# Patient Record
Sex: MALE | Race: WHITE | NOT HISPANIC OR LATINO | Employment: OTHER | URBAN - METROPOLITAN AREA
[De-identification: names, ages, dates, MRNs, and addresses within clinical notes are randomized per-mention and may not be internally consistent; named-entity substitution may affect disease eponyms.]

---

## 2020-06-30 ENCOUNTER — HOSPITAL PATHOLOGY (OUTPATIENT)
Dept: OTHER | Facility: CLINIC | Age: 60
End: 2020-06-30

## 2020-07-03 LAB — COPATH REPORT: NORMAL

## 2021-10-08 ENCOUNTER — TRANSFERRED RECORDS (OUTPATIENT)
Dept: HEALTH INFORMATION MANAGEMENT | Facility: CLINIC | Age: 61
End: 2021-10-08

## 2021-10-11 ENCOUNTER — TRANSFERRED RECORDS (OUTPATIENT)
Dept: HEALTH INFORMATION MANAGEMENT | Facility: CLINIC | Age: 61
End: 2021-10-11

## 2022-11-08 ENCOUNTER — TRANSFERRED RECORDS (OUTPATIENT)
Dept: HEALTH INFORMATION MANAGEMENT | Facility: CLINIC | Age: 62
End: 2022-11-08

## 2023-04-11 ENCOUNTER — TRANSFERRED RECORDS (OUTPATIENT)
Dept: HEALTH INFORMATION MANAGEMENT | Facility: CLINIC | Age: 63
End: 2023-04-11

## 2023-04-13 ENCOUNTER — TRANSFERRED RECORDS (OUTPATIENT)
Dept: HEALTH INFORMATION MANAGEMENT | Facility: CLINIC | Age: 63
End: 2023-04-13

## 2023-05-08 ENCOUNTER — HOSPITAL ENCOUNTER (EMERGENCY)
Facility: CLINIC | Age: 63
Discharge: LEFT BEFORE DISCHARGE | End: 2023-05-08
Payer: COMMERCIAL

## 2023-05-24 ENCOUNTER — TRANSFERRED RECORDS (OUTPATIENT)
Dept: HEALTH INFORMATION MANAGEMENT | Facility: CLINIC | Age: 63
End: 2023-05-24

## 2023-05-25 ENCOUNTER — TRANSFERRED RECORDS (OUTPATIENT)
Dept: HEALTH INFORMATION MANAGEMENT | Facility: CLINIC | Age: 63
End: 2023-05-25

## 2023-05-25 LAB
ALT SERPL-CCNC: 20 IU/L (ref 0–44)
AST SERPL-CCNC: 15 IU/L (ref 0–40)

## 2023-06-06 ENCOUNTER — TRANSFERRED RECORDS (OUTPATIENT)
Dept: HEALTH INFORMATION MANAGEMENT | Facility: CLINIC | Age: 63
End: 2023-06-06

## 2023-08-31 ENCOUNTER — REFERRAL (OUTPATIENT)
Dept: TRANSPLANT | Facility: CLINIC | Age: 63
End: 2023-08-31

## 2023-08-31 DIAGNOSIS — N18.4 CHRONIC KIDNEY DISEASE, STAGE 4, SEVERELY DECREASED GFR (H): Primary | ICD-10-CM

## 2023-08-31 DIAGNOSIS — D47.2 MGUS (MONOCLONAL GAMMOPATHY OF UNKNOWN SIGNIFICANCE): ICD-10-CM

## 2023-08-31 DIAGNOSIS — N18.4 CHRONIC KIDNEY DISEASE, STAGE IV (SEVERE) (H): ICD-10-CM

## 2023-08-31 DIAGNOSIS — G47.33 OBSTRUCTIVE SLEEP APNEA: ICD-10-CM

## 2023-08-31 DIAGNOSIS — Z01.818 PRE-TRANSPLANT EVALUATION FOR KIDNEY TRANSPLANT: ICD-10-CM

## 2023-08-31 DIAGNOSIS — Z76.82 ORGAN TRANSPLANT CANDIDATE: ICD-10-CM

## 2023-08-31 NOTE — LETTER
Sept. 12, 2023    Titus Fowler  [address]  [place] [address]    Dear Titus,    Thank you for your interest in the Transplant Center at [facility]. We look forward to being a part of your care team and assisting you through the transplant process.    As we discussed, your transplant coordinator is Roxie Herman (Kidney).  You may call your coordinator at any time with questions or concerns.  Your first scheduled call will be on Sept. 19, 2023 about 1:00 pm.  If this needs to change, call [phone].    Please complete the following.    Fill out and return the enclosed forms  Authorization for Electronic Communication  Authorization to Discuss Protected Health Information  Authorization for Release of Protected Health Information    Sign up for:  [employer], access to your electronic medical record (see enclosed pamphlet)  [email or web address], a transplant education website        You can use these tools to learn more about your transplant, communicate with your care team, and track your medical details                  Sincerely,      Solid Organ Transplant  [facility]    cc: Care Team

## 2023-09-08 ENCOUNTER — TELEPHONE (OUTPATIENT)
Dept: TRANSPLANT | Facility: CLINIC | Age: 63
End: 2023-09-08
Payer: COMMERCIAL

## 2023-09-08 NOTE — TELEPHONE ENCOUNTER
Patient Call: General  Route to LPN    Reason for call: Titus up's nurse call.    Call back needed? Yes    Return Call Needed  Same as documented in contacts section  When to return call?: Same day: Route High Priority

## 2023-09-12 ENCOUNTER — DOCUMENTATION ONLY (OUTPATIENT)
Dept: TRANSPLANT | Facility: CLINIC | Age: 63
End: 2023-09-12
Payer: COMMERCIAL

## 2023-09-12 VITALS — BODY MASS INDEX: 27.4 KG/M2 | HEIGHT: 69 IN | WEIGHT: 185 LBS

## 2023-09-12 NOTE — TELEPHONE ENCOUNTER
PCP: Agustín Taveras [facility]  Referring Organization: [facility]  Referring Provider: Genevieve RAMSEY CNP   Referring Diagnosis: CKD stage 4   Nephrologist: Dr. Hanna Epps    GFR/Date: 19 (8/24/23)    Is patient under the age of 65? yes  Is patient diabetic? no  Is patient on insulin? no  Was patient offered a pancreas transplant referral? no    Is patient in a group home/assisted living? no  Does patient have a guardian? no    Referral intake process completed.  Patient is aware that after financial approval is received, medical records will be requested.   Patient confirmed for a callback from transplant coordinator on Sept. 19, 2023. (within 2 weeks)  Tentative evaluation date Oct. 30, 2023 slot 1 (within 4 weeks) if appointment is virtual, does patient have capabilities of setting this up?     Confirmed coordinator will discuss evaluation process in more detail at the time of their call.   Patient is aware of the need to arrange age appropriate cancer screening, vaccinations, and dental care.  Reminded patient to complete questionnaire, complete medical records release, and review packet prior to evaluation visit .  Assessed patient for special needs (ie-wheelchair, assistance, guardian, and ):  none   Patient instructed to call [phone] with questions.     Patient gave verbal consent during intake call to obtain medical records and documents outside of [facility]:  yes

## 2023-09-20 NOTE — TELEPHONE ENCOUNTER
Reviewed pt's chart for pre-kiney transplant evaluation planning. Coordinator first call on 9/19/23. PKE STD on 10/30/23 eval A     services required no. Is pt able to attend virtual education class? yes    Pt has CKD stage 4, biopsy: none. Qualifying GFR of 18 on 7/28/23.  Pt is not on dialysis. Pt is not a diabetic, not on insulin.     Health hx: hypertension, PAUL with cpap, history of benign prostate enlargement, ulcerative colitis .Also has a history of MGUS and has followed with oncology and reports his markers are gone  Heart hx: no significant heart history.  Lung hx: NA. Surgical hx: had a TURP done in 2018 due to bladder retention.  Pt is not smoker, does rarely consume alcohol, and no recreational drugs. Does not have current infection, no non-healing wounds, or active cancer.    Health maintenance items: BMI 27.  Colonoscopy UTD.  Dental: UTD. Vaccinations UTD. Pt is independent w/ ADLs, plays golf regularly and reports high amounts of activity in half-way  Pt lives in [place] w/ spouse.  Excellent family support following transplant. Pt has a a son interested in live donor.     Contacted patient and introduced myself as their Transplant Coordinator, also introduced the role of the Transplant Coordinator in the transplant process.  Explained the purpose of this call including reviewing next steps and answering questions.      Confirmed Referring Provider, Genevieve Sanders NP; and Primary Care Physician, Dr. Taveras. Explained to the patient of the importance of continued communication with referring providers and primary care physicians.      Reviewed components of transplant evaluation process including necessary appointments, tests, and procedures.  Instructed pt to bring 1-2 people with them to eval and to eat and drink and normally on eval day    Answered questions for patient regarding evaluation, provided my name and contact information and requested they call/message with any  additional questions or concerns.  Informed patient they will receive a letter with information discussed in referral call. Determined that patient would like information regarding transplant by:       Email, MyChart, and/or Phone Call.  Encouraged the use of [employer].    Informed pt about transplant educational website, asked to watch pre-kidney or sign up for education class prior to evaluation. Link for educational videos were provided.  Additional informational web sites about transplant were discussed. Links provided to www.unos.org and www.srtr.org in letter sent to patient.  Pt expressed excellent understanding of all and were in good agreement with the plan.    Confirmed STD 10/30/23. Informed pt they will hear from scheduling to arrange appointment times for evaluation day. As well as, receive an email from Lucero in our Office prior to eval with a Receipt of Info and educational materials - instructed to read materials and sign consent prior to eval. Smartset orders entered.

## 2023-10-26 ENCOUNTER — TELEPHONE (OUTPATIENT)
Dept: TRANSPLANT | Facility: CLINIC | Age: 63
End: 2023-10-26
Payer: COMMERCIAL

## 2023-10-26 NOTE — TELEPHONE ENCOUNTER
Spoke with patient. Confirmed upcoming PKE appointments at [facility] on 10/30/23 starting at 0730. Instructed patient may eat breakfast and take regularly scheduled medications.

## 2023-10-27 NOTE — PROGRESS NOTES
TRANSPLANT NEPHROLOGY RECIPIENT EVALUATION NOTE    Assessment and Plan:  # Kidney Transplant Evaluation: Patient is a good candidate overall. Benefits of a living donor transplant were discussed.    # CKD: from unclear etiology, but likely from longstanding hypertension and bladder outlet obstruction: Progressive disease since 2018 with recent eGFRs of 19-20. When ready, he would likely benefit from a kidney transplant.     # BPH: S/p laser vaporization with significant symptom improvement. PVR 5 ml.     # Cardiac Risk: no known history of kidney disease. Given long standing risk factors, will need risk assessment.     # PAD Screening: per surgery     # MGUS: normal kappa/lambda ratio 1.07 and no monoclonal peak - as last oncology visit in August 2023 .Benign bone marrow exam with normal flow cytometry, followed by outside oncology and recently see in August 2021. Appreciate oncology input regarding slight increase of monoclonal protein of 0.1 today.     5/2020 BMB: Bone marrow cellularity 50% with adequate trilineage hematopoiesis and 2% polytypic plasma cells. No evidence of a lymphomatous infiltrate     # Ulcerative Colitis: stable with Stelara. No active disease on 11/2022 colonoscopy. Followed by [facility].     # Health Maintenance: Colonoscopy: Up to date and Dental: Up to date    - Discussed the risks and benefits of a transplant, including the risk of surgery and immunosuppression medications.  Patient's overall evaluation will be discussed in the Transplant Program's regular meeting with a final recommendation on the patients suitability for transplant to be made at that time.    Pending completion of the full evaluation, patient presently appears to be enough of an acceptable kidney transplant recipient candidate to have any potential kidney donors start the evaluation process.  Patient was seen in conjunction with     Evaluation:  Titus Fowler was seen in consultation at the request of Dr. Elisa Rader  "for evaluation as a potential kidney transplant recipient.    Reason for Visit:  Titus Fowler is a 62 year old male with CKD from unknown etiology, who presents for kidney transplant evaluation.    History of Present Illness:  Titus Fowler is a 62-year-old male with history of CKD from unclear etiology.  likely from longstanding hypertension and bladder outlet obstruction. History of HTN since your 40s.  Found to have an elevated Cr  ~ 1.7 mg/dl in 2017 in the setting of BPH and urinary retention for which he underwent laser vaporization of the prostate in 2018. Creatinine baseline was then  1.9 to 2.1 mg/dl since September of 2018 -> 2.5-2.7 mg/dl in 2023 ->3.4-3.7 mg/dl since February 2023 with correlating eGFRs of 19-20.                Kidney Disease Hx:          Kidney Disease Dx:  longstanding hypertension and bladder outlet obstruction.       Biopsy Proven: No         On Dialysis: No       Primary Nephrologist: Dr. Epps        H/o Kidney Stones: No       H/o Recurrent/Frequent UTI: No         Diabetic Hx: None           Cardiac/Vascular Disease Risk Factors:        Cardiac Risk Factors: Hypertension, CKD, and Smoking       Known CAD: No       Known PAD/Caludication Symptoms: No       Known Heart Failure: No       Arrhythmia: No       Pulmonary Hypertension: No       Valvular Disease: No       Other: None         Viral Serology Status       CMV IgG Antibody: Unknown       EBV IgG Antibody: Unknown         Volume Status/Weight:        Volume status: Euvolemic       Weight:  Acceptable BMI       BMI: There is no height or weight on file to calculate BMI.         Functional Capacity/Frailty:        Retired . Stays active playing tennis, golf, fishing, kayaking, going to the cabin, snow mobiling,  ice fishing. No exertional symptoms.     Fatigue/Decreased Energy: [] No [x] Yes \"Up and down\"   Chest Pain or SOB with Exertion: [] No [x] Yes Dyspnea sometimes with exercise,  c/o random " heart flutters.    Significant Weight Change: [x] No [] Yes    Nausea, Vomiting or Diarrhea: [x] No [] Yes    Fever, Sweats or Chills:  [x] No [] Yes    Leg Swelling [x] No [] Yes        History of Cancer: None      Allergy Testing Questions:   Medication that caused a reaction None   Antibiotics used that didn't give an allergic reaction?  None    COVID Vaccination Up To Date: Yes    Potential Living Kidney Donors: Yes    Review of Systems:  A comprehensive review of systems was obtained and negative, except as noted in the HPI or PMH.    Past Medical History:   Medical record was reviewed and PMH was discussed with patient and noted below.  Past Medical History:   Diagnosis Date    Hypertension     Noninfectious ileitis     ulcerative colitis     Sleep apnea     c pap       Past Social History:   Past Surgical History:   Procedure Laterality Date    ESOPHAGOSCOPY, GASTROSCOPY, DUODENOSCOPY (EGD), COMBINED N/A 10/16/2015    Procedure: COMBINED ENDOSCOPIC ULTRASOUND, ESOPHAGOSCOPY, GASTROSCOPY, DUODENOSCOPY (EGD), FINE NEEDLE ASPIRATE/BIOPSY;  Surgeon: Bambi Salgado MD;  Location:  GI    GENITOURINARY SURGERY  [address] [place] [address] UGI ENDOSCOPY W EUS N/A 10/02/2014    Procedure: COMBINED ENDOSCOPIC ULTRASOUND, ESOPHAGOSCOPY, GASTROSCOPY, DUODENOSCOPY (EGD);  Surgeon: Bambi Salgado MD;  Location:  OR    HEAD & NECK SURGERY      dental surg    ORTHOPEDIC SURGERY      nerve repair right hand, meniscus repair-right     Personal history of bleeding or anesthesia problems: No    Family History:  No family history on file.    Personal History:   Social History     Socioeconomic History    Marital status:      Spouse name: Not on file    Number of children: Not on file    Years of education: Not on file    Highest education level: Not on file   Occupational History    Not on file   Tobacco Use    Smoking status: Former     Types: Cigars    Smokeless tobacco: Never    Tobacco comments:      has one cigar every 3 mos   Substance and Sexual Activity    Alcohol use: Yes     Comment: 3-6 drinks per week    Drug use: No     Comment: THC gummies - sleep    Sexual activity: Not on file   Other Topics Concern    Not on file   Social History Narrative    Not on file     Social Determinants of Health     Financial Resource Strain: Not on file   Food Insecurity: Not on file   Transportation Needs: Not on file   Physical Activity: Not on file   Stress: Not on file   Social Connections: Not on file   Interpersonal Safety: Not on file   Housing Stability: Not on file       Allergies:  Allergies   Allergen Reactions    Nuts [Peanut-Containing Drug Products] Anaphylaxis       Medications:  Current Outpatient Medications   Medication Sig    ATENOLOL PO Take 25 mg by mouth daily    cetirizine (ZYRTEC) 10 MG tablet Take 20 mg by mouth daily    EPINEPHrine (EPIPEN) 0.3 MG/0.3ML injection Inject 0.3 mLs (0.3 mg) into the muscle once as needed    fluticasone (FLONASE) 50 MCG/ACT nasal spray Spray 2 sprays into both nostrils daily    HYDROcodone-acetaminophen (NORCO) 5-325 MG per tablet Take 1-2 tablets by mouth every 4 hours as needed for moderate to severe pain    InFLIXimab (REMICADE IV) Iv infusion q8wks    tamsulosin (FLOMAX) 0.4 MG 24 hr capsule Take by mouth daily Takes 2 tabs daily     No current facility-administered medications for this visit.       Vitals:  There were no vitals taken for this visit.    Exam:  GENERAL APPEARANCE: alert and no distress  HENT: mouth without ulcers or lesions  RESP: lungs clear to auscultation - no rales, rhonchi or wheezes  CV: regular rhythm, normal rate, no rub, no murmur  EDEMA: no LE edema bilaterally  ABDOMEN: soft, nondistended, nontender, bowel sounds normal  MS: extremities normal - no gross deformities noted, no evidence of inflammation in joints, no muscle tenderness  SKIN: no rash    Results:   No results found for this or any previous visit (from the past 336  hour(s)).

## 2023-10-27 NOTE — PROGRESS NOTES
[facility] Solid Organ Transplant  Outpatient MNT: Kidney Transplant Evaluation    Current BMI: 28.5 (HT 69.5 in,  lbs/89 kg)  BMI guideline for kidney transplant up to a BMI of 40 / per surgeon discretion     Frailty Assessment -- Not Frail (0/5 points)-       Time Spent: 30 minutes  Visit Type: Initial   Referring Physician: Dior   Pt accompanied by: his wife & dtr     History of previous txp: none   Dialysis: no    Nutrition Assessment  Pt reports watching sodium a bit more lately.    - Appetite: good/baseline   - Food allergies/intolerances: instance 10 years ago with cashews & peanuts - but no issues since   - Meal prep & grocery shopping: pt and wife   - Previous RD education: no   - Issues chewing or swallowing: no  - N/V/D/C: no  - Food access concerns: not asked     Vitamins, Supplements, Pertinent Meds: vit D  Herbal Medicines/Supplements: none     Edema: none    Weight hx: wt stable overall     Diet Recall  Breakfast Fruit (cantaloupe, grapes), toast w/ PB, cold leftovers   Lunch Leftovers, sandwich (BLT), homemade soup, turkey s/w    Dinner Chicken casserole - with can of soup; pizza; grilled steak/chicken + veggie/salad    Snacks Crackers w/ cheese, homemade roasted pumpkin seeds, licorice, chocolate   Beverages Water w/ 4 oz cranberry cherry juice, coffee, watered down rapsberry lemonade concentrate, Gatorade 1x/week    Alcohol 5 drinks/week (mostly wine, some beer)   Dining out 2-3x/week: Mexican, [place] [employer] turkey s/w, Chinese      Physical Activity  Tennis 1x/week- 2 hours  Golfing   Skating in the winter   [email or web address]  10/26 K 4.4 Phos 2.9     Anthropometrics   IBW/%IBW: 163/119   Dosing wt: 195 lbs/89 kg    Estimated Nutrition Needs   Protein: 55-70 grams/day (0.6-0.8 g/kg) for CKD    Nutrition Diagnosis  No nutrition diagnosis identified at this time     Nutrition Intervention  Nutrition education provided:  Discussed sodium intake (low sodium foods and drinks,  seasoning food without salt and tips for low sodium diet).  Encouraged pt to look at labels, online, for perspective for sodium content of foods, etc. Consider keeping track of sodium to get a sense of how much he is consuming.   Reviewed protein amount, plant vs animal protein, etc.   No need to restrict K/Phos in diet based on wnl lab values     Reviewed post txp diet guidelines in brief (will review in further detail post txp):  (1) Review of proper food safety measures d/t immunosuppressant therapy post-op and increased risk for food-borne illness    (2) Avoid the following post txp d/t risk for rejection, unknown effects on the organs, and/or potential interactions with immunosuppressants:  - Herbal, Chinese, holistic, chiropractic, natural, alternative medicines and supplements  - Detoxes and cleanses  - Weight loss pills  - Protein powders or other products with extracts or herbs (ie green tea extract)    (3) Med regimen and possible side effects    Patient Understanding: Pt verbalized understanding of education provided.  Expected Engagement: Good  Follow-Up Plans: PRN     Nutrition Goals  No nutrition goals identified at this time     Vianca Roper, RD, LD, CCTD

## 2023-10-28 ENCOUNTER — HEALTH MAINTENANCE LETTER (OUTPATIENT)
Age: 63
End: 2023-10-28

## 2023-10-29 LAB
A1 AB TITR SERPL: 64 {TITER}
A1 AB TITR SERPL: 64 {TITER}
A2 AB TITR SERPL: 16 {TITER}
A2 AB TITR SERPL: 16 {TITER}
ABO/RH(D): NORMAL
ABO/RH(D): NORMAL
ANTIBODY SCREEN: NEGATIVE
ANTIBODY TITER IGM SCREEN: NEGATIVE
SPECIMEN EXPIRATION DATE: NORMAL

## 2023-10-30 ENCOUNTER — ANCILLARY PROCEDURE (OUTPATIENT)
Dept: GENERAL RADIOLOGY | Facility: CLINIC | Age: 63
End: 2023-10-30
Attending: NURSE PRACTITIONER
Payer: COMMERCIAL

## 2023-10-30 ENCOUNTER — ANCILLARY PROCEDURE (OUTPATIENT)
Dept: CARDIOLOGY | Facility: CLINIC | Age: 63
End: 2023-10-30
Attending: NURSE PRACTITIONER
Payer: COMMERCIAL

## 2023-10-30 ENCOUNTER — LAB (OUTPATIENT)
Dept: LAB | Facility: CLINIC | Age: 63
End: 2023-10-30
Attending: NURSE PRACTITIONER
Payer: COMMERCIAL

## 2023-10-30 ENCOUNTER — OFFICE VISIT (OUTPATIENT)
Dept: TRANSPLANT | Facility: CLINIC | Age: 63
End: 2023-10-30
Attending: NURSE PRACTITIONER
Payer: COMMERCIAL

## 2023-10-30 ENCOUNTER — DOCUMENTATION ONLY (OUTPATIENT)
Dept: TRANSPLANT | Facility: CLINIC | Age: 63
End: 2023-10-30

## 2023-10-30 VITALS
SYSTOLIC BLOOD PRESSURE: 141 MMHG | HEIGHT: 70 IN | HEART RATE: 78 BPM | WEIGHT: 195.9 LBS | DIASTOLIC BLOOD PRESSURE: 82 MMHG | OXYGEN SATURATION: 99 % | BODY MASS INDEX: 28.05 KG/M2

## 2023-10-30 DIAGNOSIS — N18.4 CHRONIC KIDNEY DISEASE, STAGE IV (SEVERE) (H): ICD-10-CM

## 2023-10-30 DIAGNOSIS — D47.2 MGUS (MONOCLONAL GAMMOPATHY OF UNKNOWN SIGNIFICANCE): ICD-10-CM

## 2023-10-30 DIAGNOSIS — G47.33 OBSTRUCTIVE SLEEP APNEA: ICD-10-CM

## 2023-10-30 DIAGNOSIS — Z01.818 PRE-TRANSPLANT EVALUATION FOR KIDNEY TRANSPLANT: ICD-10-CM

## 2023-10-30 DIAGNOSIS — Z76.82 ORGAN TRANSPLANT CANDIDATE: ICD-10-CM

## 2023-10-30 DIAGNOSIS — N18.4 CHRONIC KIDNEY DISEASE, STAGE 4, SEVERELY DECREASED GFR (H): ICD-10-CM

## 2023-10-30 LAB
ALBUMIN SERPL BCG-MCNC: 4.2 G/DL (ref 3.5–5.2)
ALBUMIN UR-MCNC: NEGATIVE MG/DL
ALP SERPL-CCNC: 72 U/L (ref 40–129)
ALT SERPL W P-5'-P-CCNC: 28 U/L (ref 0–70)
ANION GAP SERPL CALCULATED.3IONS-SCNC: 10 MMOL/L (ref 7–15)
APPEARANCE UR: CLEAR
APTT PPP: 28 SECONDS (ref 22–38)
AST SERPL W P-5'-P-CCNC: 27 U/L (ref 0–45)
BASOPHILS # BLD AUTO: 0.1 10E3/UL (ref 0–0.2)
BASOPHILS NFR BLD AUTO: 1 %
BILIRUB SERPL-MCNC: 0.3 MG/DL
BILIRUB UR QL STRIP: NEGATIVE
BUN SERPL-MCNC: 41.3 MG/DL (ref 8–23)
CALCIUM SERPL-MCNC: 9.6 MG/DL (ref 8.8–10.2)
CHLORIDE SERPL-SCNC: 103 MMOL/L (ref 98–107)
COLOR UR AUTO: ABNORMAL
CREAT SERPL-MCNC: 3.25 MG/DL (ref 0.67–1.17)
DEPRECATED HCO3 PLAS-SCNC: 27 MMOL/L (ref 22–29)
EGFRCR SERPLBLD CKD-EPI 2021: 21 ML/MIN/1.73M2
EOSINOPHIL # BLD AUTO: 0.2 10E3/UL (ref 0–0.7)
EOSINOPHIL NFR BLD AUTO: 2 %
ERYTHROCYTE [DISTWIDTH] IN BLOOD BY AUTOMATED COUNT: 11.6 % (ref 10–15)
FACTOR 2 INTERPRETATION: NORMAL
FACTOR V INTERPRETATION: NORMAL
GLUCOSE SERPL-MCNC: 124 MG/DL (ref 70–99)
GLUCOSE UR STRIP-MCNC: NEGATIVE MG/DL
HCT VFR BLD AUTO: 35.7 % (ref 40–53)
HGB BLD-MCNC: 12.1 G/DL (ref 13.3–17.7)
HGB UR QL STRIP: NEGATIVE
IMM GRANULOCYTES # BLD: 0.1 10E3/UL
IMM GRANULOCYTES NFR BLD: 1 %
INR PPP: 1.04 (ref 0.85–1.15)
KETONES UR STRIP-MCNC: NEGATIVE MG/DL
LAB DIRECTOR COMMENTS: NORMAL
LAB DIRECTOR DISCLAIMER: NORMAL
LAB DIRECTOR INTERPRETATION: NORMAL
LAB DIRECTOR METHODOLOGY: NORMAL
LAB DIRECTOR RESULTS: NORMAL
LEUKOCYTE ESTERASE UR QL STRIP: NEGATIVE
LVEF ECHO: NORMAL
LYMPHOCYTES # BLD AUTO: 2.2 10E3/UL (ref 0.8–5.3)
LYMPHOCYTES NFR BLD AUTO: 22 %
MCH RBC QN AUTO: 33.2 PG (ref 26.5–33)
MCHC RBC AUTO-ENTMCNC: 33.9 G/DL (ref 31.5–36.5)
MCV RBC AUTO: 98 FL (ref 78–100)
MONOCYTES # BLD AUTO: 0.7 10E3/UL (ref 0–1.3)
MONOCYTES NFR BLD AUTO: 7 %
MUCOUS THREADS #/AREA URNS LPF: PRESENT /LPF
NEUTROPHILS # BLD AUTO: 6.7 10E3/UL (ref 1.6–8.3)
NEUTROPHILS NFR BLD AUTO: 67 %
NITRATE UR QL: NEGATIVE
NRBC # BLD AUTO: 0 10E3/UL
NRBC BLD AUTO-RTO: 0 /100
PH UR STRIP: 6 [PH] (ref 5–7)
PLATELET # BLD AUTO: 267 10E3/UL (ref 150–450)
POTASSIUM SERPL-SCNC: 3.6 MMOL/L (ref 3.4–5.3)
PROT SERPL-MCNC: 7.6 G/DL (ref 6.4–8.3)
RBC # BLD AUTO: 3.65 10E6/UL (ref 4.4–5.9)
RBC URINE: <1 /HPF
SODIUM SERPL-SCNC: 140 MMOL/L (ref 135–145)
SP GR UR STRIP: 1.01 (ref 1–1.03)
SPECIMEN DESCRIPTION: NORMAL
T PALLIDUM AB SER QL: NONREACTIVE
TOTAL PROTEIN SERUM FOR ELP: 7.2 G/DL (ref 6.4–8.3)
UROBILINOGEN UR STRIP-MCNC: NORMAL MG/DL
WBC # BLD AUTO: 9.8 10E3/UL (ref 4–11)
WBC URINE: <1 /HPF

## 2023-10-30 PROCEDURE — 81382 HLA II TYPING 1 LOC HR: CPT | Performed by: SURGERY

## 2023-10-30 PROCEDURE — 99000 SPECIMEN HANDLING OFFICE-LAB: CPT | Performed by: PATHOLOGY

## 2023-10-30 PROCEDURE — 93306 TTE W/DOPPLER COMPLETE: CPT | Performed by: INTERNAL MEDICINE

## 2023-10-30 PROCEDURE — 85025 COMPLETE CBC W/AUTO DIFF WBC: CPT | Performed by: SURGERY

## 2023-10-30 PROCEDURE — 36415 COLL VENOUS BLD VENIPUNCTURE: CPT | Performed by: SURGERY

## 2023-10-30 PROCEDURE — 86780 TREPONEMA PALLIDUM: CPT | Performed by: SURGERY

## 2023-10-30 PROCEDURE — 86901 BLOOD TYPING SEROLOGIC RH(D): CPT | Performed by: SURGERY

## 2023-10-30 PROCEDURE — 85730 THROMBOPLASTIN TIME PARTIAL: CPT | Mod: 59 | Performed by: SURGERY

## 2023-10-30 PROCEDURE — 86481 TB AG RESPONSE T-CELL SUSP: CPT | Performed by: SURGERY

## 2023-10-30 PROCEDURE — 87340 HEPATITIS B SURFACE AG IA: CPT | Performed by: SURGERY

## 2023-10-30 PROCEDURE — 84165 PROTEIN E-PHORESIS SERUM: CPT | Mod: TC | Performed by: PATHOLOGY

## 2023-10-30 PROCEDURE — 83521 IG LIGHT CHAINS FREE EACH: CPT | Performed by: NURSE PRACTITIONER

## 2023-10-30 PROCEDURE — G0452 MOLECULAR PATHOLOGY INTERPR: HCPCS | Mod: 26 | Performed by: PATHOLOGY

## 2023-10-30 PROCEDURE — 86886 COOMBS TEST INDIRECT TITER: CPT | Performed by: SURGERY

## 2023-10-30 PROCEDURE — 86850 RBC ANTIBODY SCREEN: CPT | Performed by: SURGERY

## 2023-10-30 PROCEDURE — 86832 HLA CLASS I HIGH DEFIN QUAL: CPT | Performed by: SURGERY

## 2023-10-30 PROCEDURE — 86833 HLA CLASS II HIGH DEFIN QUAL: CPT | Performed by: SURGERY

## 2023-10-30 PROCEDURE — 99213 OFFICE O/P EST LOW 20 MIN: CPT | Performed by: SURGERY

## 2023-10-30 PROCEDURE — 86803 HEPATITIS C AB TEST: CPT | Performed by: SURGERY

## 2023-10-30 PROCEDURE — 71046 X-RAY EXAM CHEST 2 VIEWS: CPT | Performed by: RADIOLOGY

## 2023-10-30 PROCEDURE — 86706 HEP B SURFACE ANTIBODY: CPT | Performed by: SURGERY

## 2023-10-30 PROCEDURE — 81001 URINALYSIS AUTO W/SCOPE: CPT | Performed by: SURGERY

## 2023-10-30 PROCEDURE — 86787 VARICELLA-ZOSTER ANTIBODY: CPT | Performed by: SURGERY

## 2023-10-30 PROCEDURE — 86644 CMV ANTIBODY: CPT | Performed by: SURGERY

## 2023-10-30 PROCEDURE — 85390 FIBRINOLYSINS SCREEN I&R: CPT | Mod: 26 | Performed by: PATHOLOGY

## 2023-10-30 PROCEDURE — 86665 EPSTEIN-BARR CAPSID VCA: CPT | Performed by: SURGERY

## 2023-10-30 PROCEDURE — 86704 HEP B CORE ANTIBODY TOTAL: CPT | Performed by: SURGERY

## 2023-10-30 PROCEDURE — 84155 ASSAY OF PROTEIN SERUM: CPT | Performed by: SURGERY

## 2023-10-30 PROCEDURE — 85670 THROMBIN TIME PLASMA: CPT | Performed by: SURGERY

## 2023-10-30 PROCEDURE — 80053 COMPREHEN METABOLIC PANEL: CPT | Performed by: SURGERY

## 2023-10-30 PROCEDURE — 84165 PROTEIN E-PHORESIS SERUM: CPT | Mod: 26 | Performed by: PATHOLOGY

## 2023-10-30 PROCEDURE — 85610 PROTHROMBIN TIME: CPT | Performed by: SURGERY

## 2023-10-30 PROCEDURE — 85730 THROMBOPLASTIN TIME PARTIAL: CPT | Performed by: SURGERY

## 2023-10-30 PROCEDURE — 86147 CARDIOLIPIN ANTIBODY EA IG: CPT | Performed by: SURGERY

## 2023-10-30 PROCEDURE — 81240 F2 GENE: CPT | Performed by: SURGERY

## 2023-10-30 PROCEDURE — 99204 OFFICE O/P NEW MOD 45 MIN: CPT | Performed by: SURGERY

## 2023-10-30 RX ORDER — CALCITRIOL 0.25 UG/1
0.25 CAPSULE, LIQUID FILLED ORAL
COMMUNITY
Start: 2022-05-08 | End: 2024-09-24

## 2023-10-30 RX ORDER — USTEKINUMAB 90 MG/ML
INJECTION, SOLUTION SUBCUTANEOUS
COMMUNITY
Start: 2023-07-19

## 2023-10-30 RX ORDER — MESALAMINE 1000 MG/1
SUPPOSITORY RECTAL
COMMUNITY
End: 2024-09-16

## 2023-10-30 RX ORDER — ATORVASTATIN CALCIUM 80 MG/1
1 TABLET, FILM COATED ORAL DAILY
COMMUNITY
Start: 2022-09-12 | End: 2024-09-24

## 2023-10-30 RX ORDER — METOPROLOL SUCCINATE 25 MG/1
2 TABLET, EXTENDED RELEASE ORAL DAILY
COMMUNITY
Start: 2022-09-14 | End: 2024-09-24

## 2023-10-30 RX ORDER — LOSARTAN POTASSIUM 25 MG/1
0.5 TABLET ORAL DAILY
COMMUNITY
Start: 2022-09-14 | End: 2024-09-24

## 2023-10-30 NOTE — LETTER
10/30/2023         RE: Titus Fowler  [address]  [place] [address]        Dear Colleague,    Thank you for referring your patient, Titus Fowler, to the [facility]. Please see a copy of my visit note below.    [facility] Solid Organ Transplant  Outpatient MNT: Kidney Transplant Evaluation    Current BMI: *** (HT *** in, WT *** lbs/*** kg)  BMI guideline for kidney transplant up to a BMI of 40 / per surgeon discretion     Frailty Assessment -- ***     Recommended Interventions to Address Frailty:  ***     Time Spent: 30 minutes  Visit Type: Initial   Referring Physician: ***  Pt accompanied by: his wife & dtr     History of previous txp: none   Dialysis: no    Nutrition Assessment  ***watching sodium a bit more  54-71  Protein, sodium   Hgs 40   Walk 7.41    - Appetite: good/baseline   - Food allergies/intolerances: instance 10 years ago with cashews & peanuts   - Meal prep & grocery shopping: pt and wife   - Previous RD education: ***  - Issues chewing or swallowing: no  - N/V/D/C: no  - Food access concerns: ***    Vitamins, Supplements, Pertinent Meds: vit D  Herbal Medicines/Supplements: none     Edema: none    Weight hx: wt stable overall     Diet Recall  Breakfast Fruit (cantaloupe, grapes), toast w/ PB, cold leftovers   Lunch Leftovers, sandwich (BLT), homemade soup, turkey s/w    Dinner Chicken casserole - with can of soup; pizza; grilled steak/chicken + veggie/salad    Snacks Crackers w/ cheese, homemade roasted pumpkin seeds, licorice, chocolate   Beverages Water w/ 4 oz cranberry cherry juice, coffee, watered down rapsberry lemonade concentrate, Gatorade 1x/week    Alcohol 5 drinks/week (mostly wine, some beer)   Dining out 2-3x/week: Mexican, [place] [employer] turkey s/w, Chinese      Physical Activity  Doubles tennis 1x/week- 2 hours  Golfing   Skating in the winter   Fishing, kayaking   ADLs does independently    Labs  10/26 K 4.4 Phos 2.9      Malnutrition (delete) ***  % Intake: {% Intake :354066}  % Weight Loss: {% Weight Loss :410118}  Subcutaneous Fat Loss: ***  Muscle Loss: ***  Fluid Accumulation/Edema: {Fluid/Edema :937226}  Malnutrition Diagnosis: {:359647} in the context of ***    Anthropometrics (delete)***  IBW/%IBW: ***/***   Dosing wt: *** lbs/*** kg    Estimated Nutrition Needs (delete) ***    Nutrition Diagnosis  ***No nutrition diagnosis identified at this time     Nutrition Intervention  Nutrition education provided:  Discussed sodium intake (low sodium foods and drinks, seasoning food without salt and tips for low sodium diet).  ***    Reviewed post txp diet guidelines in brief (will review in further detail post txp):  (1) Review of proper food safety measures d/t immunosuppressant therapy post-op and increased risk for food-borne illness    (2) Avoid the following post txp d/t risk for rejection, unknown effects on the organs, and/or potential interactions with immunosuppressants:  - Herbal, Chinese, holistic, chiropractic, natural, alternative medicines and supplements  - Detoxes and cleanses  - Weight loss pills  - Protein powders or other products with extracts or herbs (ie green tea extract)    (3) Med regimen and possible side effects    Patient Understanding: Pt verbalized understanding of education provided.  Expected Engagement: ***  Follow-Up Plans: PRN     Nutrition Goals  No nutrition goals identified at this time   ***    Vianca Roper, RD, LD, CCTD                                      TRANSPLANT NEPHROLOGY RECIPIENT EVALUATION NOTE    Assessment and Plan:  # Kidney Transplant Evaluation: Patient is a {desc.:510857} candidate overall. {Benefits of transplant discussed (Optional):157954}    # CKD likely from hypertension and bladder outlet obstruction: progressive disease since 2018 with recent eGFRs of 19-20.     # BPH: S/p laser vaporization with significant symptom improvement. PVR 5 ml.     # Cardiac Risk: no  known history of kidney disease. Given long standing risk factors, will need risk assessment.     # PAD Screening: {FV TX RENAL PAD SCREENING (Optional):273705}    # MGUS: normal kappa/lambda ratio 1.07 and no monoclonal peak - last checked  in August 2023 .Benign bone marrow exam with normal flow cytometry, followed by outside oncology.     5/2020 BMB: Bone marrow cellularity 50% with adequate trilineage hematopoiesis and 2% polytypic plasma cells. No evidence of a lymphomatous infiltrate     # Renal cysts: ***    # Former smoker: rare cigar once a year.     # Health Maintenance: Colonoscopy: {UMP TX UP TO DATE:377515130} and Dental: {UMP TX UP TO DATE:637879340}    - Discussed the risks and benefits of a transplant, including the risk of surgery and immunosuppression medications.  Patient's overall evaluation will be discussed in the Transplant Program's regular meeting with a final recommendation on the patients suitability for transplant to be made at that time.    {FV RENAL TX [facility] KIDNEY CANDIDATE (Optional):55104996}  Patient was seen in conjunction with { RENAL TX [facility] NEPHROLOGIST (Optional):24714074} as part of a shared visit.    Evaluation:  Titus Fowler was seen in consultation at the request of  {Referring Surgeon:70374910} for evaluation as a potential {[facility] Living donor organ:967273} transplant recipient.    Reason for Visit:  Titus Fowler is a 62 year old male with {[facility] TRANSPLANT ESKD/CKD/DM:375498922}, who presents for {[facility] Living donor organ:688508} transplant evaluation.    History of Present Illness:  Titus Fowler is a 62-year-old male with history of CKD from unclear etiology.  likely from longstanding hypertension and bladder outlet obstruction. History of HTN since your 40s.  Found to have an elevated Cr  ~ 1.7 mg/dl in 2017 in the setting of BPH and urinary retention for which he underwent laser vaporization of the prostate in 2018. Creatinine baseline was then  1.9 to 2.1  "mg/dl since September of 2018 -> 2.5-2.7 mg/dl in 2023 ->3.4-3.7 mg/dl since February 2023 with correlating eGFRs of 19-20.       Bladder neck constriction.     Remicade?    ABO-B.            Kidney Disease Hx:          Kidney Disease Dx:  longstanding hypertension and bladder outlet obstruction.       Biopsy Proven: No         On Dialysis: No       Primary Nephrologist: Dr. Epps        H/o Kidney Stones: No       H/o Recurrent/Frequent UTI: No         Diabetic Hx: None           Cardiac/Vascular Disease Risk Factors:        Cardiac Risk Factors: Hypertension, CKD, and Smoking       Known CAD: {YES WITH WILD CARD/NO:00772602}       Known PAD/Caludication Symptoms: {YES WITH WILD CARD/NO:72691348}       Known Heart Failure: {Cardiac Risk Factors:613359}       Arrhythmia: {YES WITH WILD CARD/NO:55138827}       Pulmonary Hypertension: {YES WITH WILD CARD/NO:40481442}       Valvular Disease: {YES WITH WILD CARD/NO:31245774}       Other: {Cardiac Risk Factors:183460}         Viral Serology Status       CMV IgG Antibody: {FV RENAL POS/NEG/UNK ***:510030}       EBV IgG Antibody: {FV RENAL POS/NEG/UNK ***:217180}         Volume Status/Weight:        Volume status: { :950865}       Weight:  {FV RENAL TX Recip Eval Weight:637735}       BMI: There is no height or weight on file to calculate BMI.         Functional Capacity/Frailty:        Retired product support management. [place], gold, fishign , kayaking, cabin, snow mobiling. Ice fishing. Played hockey in college.     Fatigue/Decreased Energy: [] No [x] Yes \"Up and down\"   Chest Pain or SOB with Exertion: [] No [x] Yes Dyspnea sometimes with exercise,  c/o random heart flutters.    Significant Weight Change: [x] No [] Yes    Nausea, Vomiting or Diarrhea: [x] No [] Yes    Fever, Sweats or Chills:  [x] No [] Yes    Leg Swelling [x] No [] Yes        History of Cancer: None      Allergy Testing Questions:   Medication that caused a reaction None   Antibiotics used that didn't " give an allergic reaction?  None    COVID Vaccination Up To Date: { :985396}    Potential Living Kidney Donors: { :125718}    Review of Systems:  {[facility] [place] TIMO:797722601}    Past Medical History:   Medical record was reviewed and PMH was discussed with patient and noted below.  Past Medical History:   Diagnosis Date     Hypertension      Noninfectious ileitis     ulcerative colitis      Sleep apnea     c pap       Past Social History:   Past Surgical History:   Procedure Laterality Date     ESOPHAGOSCOPY, GASTROSCOPY, DUODENOSCOPY (EGD), COMBINED N/A 10/16/2015    Procedure: COMBINED ENDOSCOPIC ULTRASOUND, ESOPHAGOSCOPY, GASTROSCOPY, DUODENOSCOPY (EGD), FINE NEEDLE ASPIRATE/BIOPSY;  Surgeon: Bambi Salgado MD;  Location:  GI     GENITOURINARY SURGERY  2018    [facility] UGI ENDOSCOPY W EUS N/A 10/02/2014    Procedure: COMBINED ENDOSCOPIC ULTRASOUND, ESOPHAGOSCOPY, GASTROSCOPY, DUODENOSCOPY (EGD);  Surgeon: Bambi Salgado MD;  Location:  OR     HEAD & NECK SURGERY      dental surg     ORTHOPEDIC SURGERY      nerve repair right hand, meniscus repair-right     Personal history of bleeding or anesthesia problems: {YES WITH WILD CARD/NO:66737982}    Family History:  No family history on file.    Personal History:   Social History     Socioeconomic History     Marital status:      Spouse name: Not on file     Number of children: Not on file     Years of education: Not on file     Highest education level: Not on file   Occupational History     Not on file   Tobacco Use     Smoking status: Former     Types: Cigars     Smokeless tobacco: Never     Tobacco comments:     has one cigar every 3 mos   Substance and Sexual Activity     Alcohol use: Yes     Comment: 3-6 drinks per week     Drug use: No     Comment: THC gummies - sleep     Sexual activity: Not on file   Other Topics Concern     Not on file   Social History Narrative     Not on file     Social Determinants of Health     Financial  Resource Strain: Not on file   Food Insecurity: Not on file   Transportation Needs: Not on file   Physical Activity: Not on file   Stress: Not on file   Social Connections: Not on file   Interpersonal Safety: Not on file   Housing Stability: Not on file       Allergies:  Allergies   Allergen Reactions     Nuts [Peanut-Containing Drug Products] Anaphylaxis       Medications:  Current Outpatient Medications   Medication Sig     ATENOLOL PO Take 25 mg by mouth daily     cetirizine (ZYRTEC) 10 MG tablet Take 20 mg by mouth daily     EPINEPHrine (EPIPEN) 0.3 MG/0.3ML injection Inject 0.3 mLs (0.3 mg) into the muscle once as needed     fluticasone (FLONASE) 50 MCG/ACT nasal spray Spray 2 sprays into both nostrils daily     HYDROcodone-acetaminophen (NORCO) 5-325 MG per tablet Take 1-2 tablets by mouth every 4 hours as needed for moderate to severe pain     InFLIXimab (REMICADE IV) Iv infusion q8wks     tamsulosin (FLOMAX) 0.4 MG 24 hr capsule Take by mouth daily Takes 2 tabs daily     No current facility-administered medications for this visit.       Vitals:  There were no vitals taken for this visit.    Exam:  {EXAM FAST TX:534891624}    Results:   No results found for this or any previous visit (from the past 336 hour(s)).          Transplant Surgery Consult Note     Medical record number: [id number]  YOB: 1960,   Consult requested by Dr Epps for evaluation of kidney transplant candidacy.    61 yo with UC and CKD 4 unknown etio  Mild obesity  Abd diastasis  WIll stick with RLQ incision  No prev incisions  Blood type B  No blood transfusions    Risks of the surgical procedure including but not limited to the rare risk of mortality discussed in detail. Patient verbalized good understanding and had several pertinent questions which were answered satisfactorily.       Immunosuppressive regimen, management and long term risks discussed in detail.         Assessment and Recommendations:Mr. Fowler appears to be  a good candidate for kidney transplantation and has a good understanding of the risks and benefits of this approach to the management of renal failure. The following issues should be addressed prior to finalizing his transplant candidacy:     Transplant order: Mr. Fowler has Chronic renal failure due to unknown etiology (no kidney biopsy) whose condition is not expected to resolve, is expected to progress, and is expected to continue to develop related comorbid conditions.  Recommend he be considered as a candidate for kidney.  Cardiology consult for cardiac risk stratification to be ordered: Yes  CT abdomen and pelvis without contrast to be ordered for assessment of vascular targets: Yes  Transplant listing labs ordered to include HLA, ABOx2, Cr, etc.  Dietician consult ordered: Yes  Social work consult ordered: Yes  Imaging reports reviewed:  yes  Radiology images reviewed:no  Recipient suitable to move forward with work up of living donors:  Yes      The majority of our visit was spent in counselling, discussing the medical and surgical risks of kidney transplantation. We discussed approximate wait time and how that is influenced by issues such as blood type and sensitization (PRA) and access to a living donor. I contrasted potential waiting time for living vs  donor kidneys from  normal (0-85%) or higher (%) kidney donor profile index (KDPI) donors and their associated outcomes. I would not recommend this individual to consider kidneys from high KDPI donors. The reason for this decision is best summarized as: multiple potential living donors. Potential surgical complications of kidney transplantation include bleeding, superficial or deep wound complications (infection, hernia, lymphocele), ureteral anastomotic failure (leak or stenosis), graft thrombosis, need for reoperation and other issues such as cardiac complications, pneumonia, deep venous thrombosis, pulmonary embolism, post transplant  diabetes and death. The potential for recurrent disease or need for retransplantation was also addressed. We discussed the possible need for ureteral stent (and subsequent removal), and the utility of protocol biopsy and laboratory studies to evaluate for rejection or recurrent disease. We discussed the risk of graft rejection, our center's average graft and patient survival rates, immunosuppression protocols, as well as the potential opportunity to participate in clinical trials.  We also discussed the average length of stay, recovery process, and posttransplant lab and monitoring protocol.  I emphasized the need for strict immunosuppression medication adherence and the potential for complications of immunosuppression such as skin cancer or lymphoma, as well as a very low but not zero risk of donor-derived disease transmission risks (infection, cancer). Mr. Fowler asked good questions and his candidacy will be reviewed at our Multidisciplinary Selection Committee. Thank you for the opportunity to participate in Mr. Fowler's care.      Total time: 60 minutes  Counselling time: 30 minutes    .  Tonja Ambrocio in Immunology and Transplantation  Surgical Director, Kidney & Pancreas Transplant Programs  Medical Director, Solid Organ Transplant Unit    ---------------------------------------------------------------------------------------------------    HPI: Mr. Fowler has Chronic renal failure due to unknown etiology (no kidney biopsy). The patient is non-diabetic.       The patient is not on dialysis.    Has potential kidney donors:  Yes .  Interested in participation in paired exchange if a donor is willing: Yes     The patient has the following pertinent history:       No    Yes  Dialysis:    []      [] via:       Blood Transfusion                  []      []  Number of units:   Most recently:  Pregnancy:    []      [] Number:       Previous Transplant:  []      [] Details:    Cancer    []       [] Comment:   Kidney stones   []      [] Comment:      Recurrent infections  []      []  Type:                  Bladder dysfunction  []      [] Cause:    Claudication   []      [] Distance:    Previous Amputation  []      [] Cause:     Chronic anticoagulation  []      [] Indication:   Zoroastrianism  []      []      Past Medical History:   Diagnosis Date     Hypertension      Noninfectious ileitis     ulcerative colitis      Sleep apnea     c pap     Past Surgical History:   Procedure Laterality Date     ESOPHAGOSCOPY, GASTROSCOPY, DUODENOSCOPY (EGD), COMBINED N/A 10/16/2015    Procedure: COMBINED ENDOSCOPIC ULTRASOUND, ESOPHAGOSCOPY, GASTROSCOPY, DUODENOSCOPY (EGD), FINE NEEDLE ASPIRATE/BIOPSY;  Surgeon: Bambi Salgado MD;  Location:  GI     GENITOURINARY SURGERY  2018    TURP      UGI ENDOSCOPY W EUS N/A 10/02/2014    Procedure: COMBINED ENDOSCOPIC ULTRASOUND, ESOPHAGOSCOPY, GASTROSCOPY, DUODENOSCOPY (EGD);  Surgeon: Bambi Salgado MD;  Location:  OR     HEAD & NECK SURGERY      dental surg     ORTHOPEDIC SURGERY      nerve repair right hand, meniscus repair-right     Family History   Problem Relation Age of Onset     Atrial fibrillation Mother      Atrial fibrillation Father      CABG Maternal Grandfather 70     Kidney Disease No family hx of      Cancer No family hx of      Social History     Socioeconomic History     Marital status:      Spouse name: Not on file     Number of children: Not on file     Years of education: Not on file     Highest education level: Not on file   Occupational History     Not on file   Tobacco Use     Smoking status: Former     Types: Cigars     Smokeless tobacco: Never     Tobacco comments:     has one cigar every 3 mos   Substance and Sexual Activity     Alcohol use: Yes     Comment: 3-6 drinks per week     Drug use: No     Comment: THC gummies - sleep     Sexual activity: Not on file   Other Topics Concern     Not on file   Social History  Narrative     Not on file     Social Determinants of Health     Financial Resource Strain: Not on file   Food Insecurity: Not on file   Transportation Needs: Not on file   Physical Activity: Not on file   Stress: Not on file   Social Connections: Not on file   Interpersonal Safety: Not on file   Housing Stability: Not on file       ROS:   CONSTITUTIONAL:  No fevers or chills  EYES: negative for icterus  ENT:  negative for hearing loss, tinnitus and sore throat  RESPIRATORY:  negative for cough, sputum, dyspnea  CARDIOVASCULAR:  negative for chest pain Fatigue  GASTROINTESTINAL:  negative for nausea, vomiting, diarrhea or constipation  GENITOURINARY:  negative for incontinence, dysuria, bladder emptying problems  HEME:  No easy bruising  INTEGUMENT:  negative for rash and pruritus  NEURO:  Negative for headache, seizure disorder  Allergies:   Allergies   Allergen Reactions     Nuts [Peanut-Containing Drug Products] Anaphylaxis     Medications:  Prescription Medications as of 10/30/2023         Rx Number Disp Refills Start End Last Dispensed Date Next Fill Date Owning Pharmacy    atorvastatin (LIPITOR) 80 MG tablet    9/12/2022 10/2/2024       Sig: Take 1 tablet by mouth daily    Class: Historical    Route: Oral    calcitRIOL (ROCALTROL) 0.25 MCG capsule    5/8/2022        Sig: Take 0.25 mcg by mouth    Class: Historical    Route: Oral    cholecalciferol 50 MCG (2000 UT) CAPS    2/8/2023 2/8/2024       Sig: Take 2,000 Units by mouth    Class: Historical    Route: Oral    fluticasone (FLONASE) 50 MCG/ACT nasal spray            Sig: Spray 2 sprays into both nostrils daily    Class: Historical    Route: Both Nostrils    losartan (COZAAR) 25 MG tablet    9/14/2022        Sig: Take 0.5 tablets by mouth daily    Class: Historical    Route: Oral    mesalamine (CANASA) 1000 MG suppository            Sig: UNWRAP AND INSERT 1 SUPPOSITORY RECTALLY EVERY EVENING AS NEEDED    Class: Historical    metoprolol succinate ER (TOPROL  "XL) 25 MG 24 hr tablet    9/14/2022        Sig: Take 2 tablets by mouth daily    Class: Historical    Route: Oral    STELARA 90 MG/ML    7/19/2023        Class: Historical    ATENOLOL PO            Sig: Take 25 mg by mouth daily    Class: Historical    Route: Oral    cetirizine (ZYRTEC) 10 MG tablet            Sig: Take 20 mg by mouth daily    Class: Historical    Route: Oral    EPINEPHrine (EPIPEN) 0.3 MG/0.3ML injection  2 each 3 7/3/2015        Sig: Inject 0.3 mLs (0.3 mg) into the muscle once as needed    Route: Intramuscular    HYDROcodone-acetaminophen (NORCO) 5-325 MG per tablet  20 tablet 0 8/6/2016        Sig: Take 1-2 tablets by mouth every 4 hours as needed for moderate to severe pain    Class: [employer]    Earliest Fill Date: 8/6/2016    Route: Oral    InFLIXimab (REMICADE IV)            Sig: Iv infusion q8wks    Class: Historical    Route: Intravenous    tamsulosin (FLOMAX) 0.4 MG 24 hr capsule            Sig: Take by mouth daily Takes 2 tabs daily    Class: Historical    Route: Oral          Exam:     BP (!) 141/82   Pulse 78   Ht 1.765 m (5' 9.5\")   Wt 88.9 kg (195 lb 14.4 oz)   SpO2 99%   BMI 28.51 kg/m    Appearance: in no apparent distress.   Skin: normal  Eyes:  no redness or discharge.  Sclera anicteric  Head and Neck: Normal, no rashes or jaundice  Respiratory: easy respirations, no audible wheezing.  Abdomen: rounded, No surgical scars     Psychiatric: Normal mood and affect    Diagnostics:   No results found for this or any previous visit (from the past 672 hour(s)).  No results found for: \"CPRA\"     Psychosocial Assessment For Kidney Transplantation  Patient Name/ Age: Titus Fowler 62 year old   Medical Record #: [id number]    Duration of Interview:     30 min  Process:   Face-to-Face Interview                (counseling < 50%)   Present at Appointment: Titus, Wife-Magalie and Nicole-daughter         : VOLODYMYR Villalta Date:  October 30, 2023        Type of " transplant: Kidney     Donor type:      relative and friend   Prior Transplants:    No Status of Transplant:           Current Living Situation    Location:   [address] [place] [id number]  With Whom: lives with their spouse       Family/ Social Support:    Owen resides with his spouse Magalie in [place], [place]. They share two adult children- Shar (lives in [place]) and daughter Nicole ([place]). He has two existing parents whom are elderly in [place], [place]. He has a son in law, and daughter in law. Owen has three grandchildren.    Owen feels that he has a strong support network. His spouse will be his post transplant caregiver.    available, helpful   Committed Relationship:  Magalie- spouse of 40 years    Stable/Supportive   Other Supports:   Good support network  available, occasional       Activities/ Functional Ability    Current Level: independent with ADL's     Transportation drives self       Vocational/Employment/Financial     Employment   retired   Job Description  Owen newly retired from [employer] for many years. His spouse additionally retired six months ago.       Income   Salary/wages, SS shelter, and Savings   Insurance      At this time, patient can afford medication costs:  Yes  [employer] OPEN ACCESS     Owen is able to keep his employer policy until July. He is 62 and not yet eligible for Medicare due to age or ESRD (not yet on dialysis). Owen is aware that he will need to  a private policy when his employer plan runs out until he is eligible for Medicare. He is working with his  on this piece.          Medical Status    Current Mode of Treatment for ESRD None   Complications None       Behavioral    Tobacco Use No Chemical Dependency No      Owen drinks about 3 glasses red wine/weekly. He uses THC in the edible form a few times/week.     Psychiatric Impairment Yes     Owen experiences some generalized anxiety. He is not on medication and does not see a  therapist. He feels that symptoms are managed.       Reading Ability: Good  Education Level: Bachelors Degree Recent Legal History No      Coping Style/Strategies: golf,fishing, tennis       Ability to Adhere to Complex Medical Regime: Yes     Adherence History:        Education  _X_ Medicare  _X_ Rehabilitation  _X_ Donor issues  _X_ Community resources  _X_ Post discharge housing  _X_ Financial resources  _X_ Medical insurance options  _X_ Psych adjustment  _X_ Family adjustment  _X_ Health Care Directive Provided Education   Psychosocial Risks of Transplant Reviewed and Discussed:  _X_ Increased stress related to emotional,            family, social, employment or financial           situation  _X_ Effect on work and/or disability benefits  _X_ Effect on future health and life           insurance  _X_ Transplant outcome expectations may           not be met  _X_ Mental Health Risks: anxiety,           depression, PTSD, guilt, grief and           chronic fatigue     Notable Items:   None noted.       Final Evaluation/Assessment   Patient seemed to process information well. Appeared well informed, motivated and able to follow post transplant requirements. Behavior was appropriate during interview. Has adequate income and insurance coverage. Adequate social support. No major contraindications noted for transplant.  At this time patient appears to understand the risks and benefits of transplant.      Recommendation  Acceptable    Selection Criteria Met:  Plan for support Yes   Chemical Dependence Yes   Smoking Yes   Mental Health Yes   Adequate Finances Yes    Signature: VOLODYMYR Villalta LICSW   Title: Clinical           Again, thank you for allowing me to participate in the care of your patient.        Sincerely,        Elisa Rader MD

## 2023-10-30 NOTE — PROGRESS NOTES
Kidney Transplant Referral - 8/31/2023  Patient attended appointments accompanied by wife and daughter.  Patient completed AM appointments with all [facility] providers.  Time and location of PM appointments reviewed with patient.  Patient instructed next contact from Transplant Coordinator will be following Selection Committee  Patient stated understanding  Patient states Receipt of Information for Organ Transplant Recipient form signed via [employer]  [employer] form signed and faxed to HIM  Patient states he watched [facility] videos.      Summary    Team s concerns/comments:   1) Cardiac risk assessment  2) PAD assessment  3) Ulcerative colitis  4) BPH  5) MGUS  6) Health maintenance    Candidacy category: Yellow    Action/Plan:   1) EKG, Echocardiogram today.  2) A/P CT completed 5/23.  3) Stable. Followed by [facility]  4) S/P laser vaporization. PVR in Clinic 5 ml.  5) Followed by outside Oncology. Appreciate Oncology input.  6) UTD      Expected Selection Meeting Discussion: 11/8/23

## 2023-10-30 NOTE — PATIENT INSTRUCTIONS
"Tips for a Low Sodium Diet  If you have high blood pressure, heart failure, liver problems, or kidney problems, you may need to watch your sodium intake. Too much sodium can cause thirst or shortness of breath. It can also make your body retain extra fluids.     A lower sodium diet is 2000 mg/day or less. Much lower than 1500 mg/day may not provide additional benefit.     Sodium is found in many foods. Most of our sodium comes from \"processed\" foods like canned soups, lunch meats, and TV dinners.     A major source of sodium is salt, or sodium chloride. Salt is often used to preserve foods (extend their shelf life). We have gotten used to the taste of salt in our foods. When you start to limit your salt intake, you will notice the lack of salt. Give yourself a few weeks to adjust to the change.     Tips:  -- Keep track of sodium intake to get an average of your intake  -- Read labels to self educate about which foods are higher and which foods are lower in sodium   Choose foods labeled \"low sodium\". These have less than 140 mg per serving   Foods labeled \"reduced\" or \"less\" sodium may still be high in sodium, but have less than the original version   -- Do not add salt while cooking or at the table. In recipes, you can often use half the amount of salt without giving up flavor  -- Do not add salt to water when making rice, pasta, or potatoes  -- Do not use lemon pepper, seasoned salt, garlic or onion salt, other marinades/seasonings (high in sodium)  -- Avoid processed/boxed/canned foods when able (boxed rice, noodle, or potato dishes)  -- When dining out, ask for no additional salt to be added to your food. Ask for condiments/dressings/sauces on the side so you can control how much you use  -- Avoid salt substitutes unless your doctor approves. Some products (Harris Lite Salt) contain potassium, which may affect your kidney function.     High Sodium Foods to Limit  -- Salt, kosher salt, sea salt, Himalayan salt " Called and spoke w/pt and she states that her left fingers are swollen and has a left cast on left wrist   She does not want to go back to [facility] as that is where cast was applied  Attempted to give pt an appt w/Carlyle Sutton PA-C today at 3pm but pt declined as traffic is too bad at that time  Requests appt tomorrow  Appt given at 1000 w/Carylle Sutton PA-C for cast check/change at [facility] given  Pt advised to keep elevated to reduce swelling as per msg that was given to pt  Will forward to Lien Duran and [facility] to let them know of appt and see if they have anything available today before 3pm  Please advise  (one teaspoon is approximately 2300 mg sodium)  -- Seasonings, meat tenderizers, marinades  -- Packaged sauces or gravies  -- Snack foods (chips, crackers, pork rinds, pretzels, salted nuts, beef jerky)  -- Cheese, cottage cheese  -- Fast food and restaurant meals (even if food is healthy, it often is high in sodium); most chains have nutrition info available online  -- Most canned vegetables and vegetable juices  -- Pickled and cured foods (pickles, olives, sauerkraut)  -- Condiments (BBQ sauce, soy sauce, fish sauce, teriyaki, steak sauce, salad dressing, ketchup)  -- Canned or boxed side dishes (macaroni and cheese, Hamburger [place], Rice-A-Cesario)  -- Frozen meals/TV dinners  -- Monosodium glutamate (MSG)  -- Processed meats (bologna, ham, galvan, deli meats--both at the [employer] counter and pre-packaged) and canned meats (SPAM, corned beef)  -- Soups, broths, and bouillon (can often find no salt or low sodium versions)  -- Canned tomato products (juice, spaghetti sauce, tomatoes, etc)  -- Sports drinks such as Gatorade or Powerade  -- Foods covered in sauce, gravy, or other coatings (broccoli with cheese sauce, chicken fingers, etc)  -- Biscuits and refrigerated rolls or bread     Seasoning Food Without Salt    -- Add lemon or lime juice to meat, fish, veggies, or salads  -- Mix regular or flavored vinegar with a little oil and add to salads  -- Use herbs and spices or make your own seasoning blends  -- Buy salt free blends at the store  -- Use a salt substitute with your doctor's advice     Learn which foods and seasonings go well together  Pea soup: bay leaves, parsley, or drake powder  Vegetable soup: allspice, fennel, pepper, thyme, bay leaf, garlic or drake powder  Bean soup: a pinch of dry mustard powder or cumin  Squash soup: drake powder, cinnamon, nutmeg, allspice or cloves  Potato soup: fennel, chives, parsley, scallions or pepper  Beef or vegetable stew: rosemary, bay leaves, marjoram, basil, chili powder,  fennel, onion, garlic, parsley, tarragon or pepper    Make your own salt-free blends  All purpose:  2 tsp garlic powder, 1 tsp thyme, 1 tsp onion powder, 1 tsp paprika, 1/2 tsp celery seed, 1 tsp white pepper, 1 tsp dry mustard, 1 tsp black pepper    For potatoes and veggies:  1 tsp dry mustard or oregano, 1/2 tsp cynthia, 1/2 tsp thyme, 1/4 tsp marjoram    For fish:  3/4 tsp dried parsley, 1/2 tsp onion powder, 1/2 tsp dill, 1/4 tsp marjoram, 1/4 tsp paprika    Taco seasonin T chili powder, 1/4 tsp garlic powder, 1/4 tsp onion powder, 1/4 tsp crushed red pepper, 1/4 tsp dried oregano, 1/2 tsp paprika, 1 1/2 tsp cumin, 1 tsp pepper (for 2 lbs meat)    Buy salt-free seasonings at your grocery store  Seasoning blends  -- Soto Salt Free Seasonings: All-Purpose, It's a Dilly, Spicy, Garlic & Herb, Lemon & Pepper  -- Mrs. Dash Salt Free Seasonings: Original Blend, Extra Spicy, Lemon Pepper, Southwest Chipotle, Tomato Basil Garlic, Garlic & Herb, Italian Medley, Onion and Herb, Table Blend  -- Lawry's Seasoned Pepper and Salt Free 17    Grilling blends  -- Soto [place] Mates: Salt-Free Steak or Chicken Seasonings  -- Mrs. Dash: hamburger, steak, chicken, and [place] grilling blend  -- Mrs. Dash Salt-Free Marinades: Zesty Garlic Herb, Southwestern Chipotle, [place] Grille and Lemon Herb Peppercorn    Use salt substitutes with your doctor's advice  Salt substitutes do not have sodium. They use potassium because it does not affect your blood pressure or fluid balance. Examples include Soto Saltless Salt Substitute, Harris Salt Substitute, Harris Seasoned Salt Substitute, and NuSalt    Do not use salt substitutes if:  -- You have kidney failure  -- You take potassium-sparing water pills  -- You have high levels of potassium in your blood     Food Seasonings   Beef Bay leaf, celery seed, drake powder, dry mustard powder, garlic, green pepper, marjoram, mushrooms, onion, oregano, paprika, pepper, cynthia, thyme    chicken Basil, olivares powder, dry mustard powder, garlic, escobar, green pepper, lemon juice, mushrooms, nutmeg, paprika, pepper, pineapple, poultry seasoning, cynthia, tarragon, thyme   [place]  Cranberries, mushrooms, paprika, parsley, poultry seasoning, cynthia, thyme   Fish  Bay leaf, cayenne, celery seed, olivares powder, dill, dry mustard powder, garlic, lemon juice, mace, marjoram, mushrooms, nutmeg, paprika, pepper, thyme   Pork  Applesauce, apple, olivares powder, garlic, onion, oregano, pepper, rosemary, cynthia, thyme   Lamb  Olivares powder, garlic, mint, pineapple, rosemary   Veal  Apricot, bay leaf, olivares powder, escobar, marjoram, oregano   Eggs  Basil, cayenne hot sauce, chives, cumin, dill, mustard seed, pepper, sweet peppers, onions    Cottage cheese Chives, marjoram, pepper   Rice  Chives, cilantro, olivares powder, green pepper, onion, pimento, saffron, crushed red pepper   Asparagus  Garlic, lemon juice, vinegar    Broccoli  Mustard, pepper   Carrots  Cinnamon, cloves, mace, mint, rosemary, cynthia    Cauliflower  Nutmeg, pepper   Corn  Cumin, olivares powder, green pepper, onion, paprika, parsley, pepper, pimento, tomato    Cucumbers  Chives, dill, garlic, vinegar    Green beans  Olivares powder, dill, lemon juice, marjoram, nutmeg, oregano, pepper, tarragon, thyme    Peas  Green pepper, mint, mushrooms, onion, oregano, cynthia    Potatoes  Chives, dill, garlic, green pepper, mace, mustard, onion, paprika, parsley, pepper, cynthia    Spinach  Garlic, lemon juice, marjoram, onion, vinegar    Summer squash  Cloves, olivares powder, marjoram, nutmeg, rosemary, cynthia    Tomatoes Basil, bay leaf, cilantro, dill, marjoram, mint, onion, oregano, paprika, parsley, pepper   Winter squash  Allspice, brown sugar, cinnamon, escobar, mace, nutmeg, onion      Sources of Protein  Below is a list of high protein foods. Your goal is 55-70 grams per day.  For animal proteins (chicken, beef, fish), deck of cards size is approximately 3 ounces or 24 grams  protein.  Food Portion  Grams of Protein   Animal proteins (chicken, turkey, venison, fish/seafood, red meat) 1 ounce  7-9   Egg  1  6   Cottage cheese  1/4 cup  7    Cheese  1 ounce (1 slice, 1 cheese stick) 6   Milk (cow's) 4 ounces or 1/2 cup  4   Dry skim milk powder 2 Tbsp  4   Ricotta cheese  1/4 cup  7    [place] Instant Breakfast (made with milk) 1/2 cup  7   Pudding 1/2 cup  4   Yogurt (ie Yoplait) 1/2 cup  5   Greek yogurt 5 oz container 15   Tofu  1/4 cup  5   Soymilk  1/2 cup  3   Tempeh  1/4 cup  8   Lentils  1/4 cup 5   Kidney beans, black beans, etc 1/4 cup  4   Chickpeas 1/4 cup  4   Veggie burger  1 zachery  7   Soy nuts  1/4 cup  17   Sunflower seeds  2 Tbsp  8   Peanuts  1 ounce 7   Almonds  15 6   Pistachios 25 6   Peanut/almond butter 2 Tbsp  8      Protein bars, ready-to-drink products (ie Premier Protein, Boost, Ensure, etc), or protein powders are other options to supplement your protein intake.

## 2023-10-30 NOTE — PROGRESS NOTES
Psychosocial Assessment For Kidney Transplantation  Patient Name/ Age: Titus Fowler 62 year old   Medical Record #: [id number]    Duration of Interview:     30 min  Process:   Face-to-Face Interview                (counseling < 50%)   Present at Appointment: Titus, Wife-Magalie and Nicole-daughter         : VOLODYMYR Villalta Date:  October 30, 2023        Type of transplant: Kidney     Donor type:      relative and friend   Prior Transplants:    No Status of Transplant:           Current Living Situation    Location:   [address] [place] [id number]  With Whom: lives with their spouse       Family/ Social Support:    Owen resides with his spouse Magalie in [place], [place]. They share two adult children- Shar (lives in [place]) and daughter Nicole ([place]). He has two existing parents whom are elderly in [place], [place]. He has a son in law, and daughter in law. Owen has three grandchildren.    Owen feels that he has a strong support network. His spouse will be his post transplant caregiver.    available, helpful   Committed Relationship:  Magalie- spouse of 40 years    Stable/Supportive   Other Supports:   Good support network  available, occasional       Activities/ Functional Ability    Current Level: independent with ADL's     Transportation drives self       Vocational/Employment/Financial     Employment   retired   Job Description  Owen newly retired from [employer] for many years. His spouse additionally retired six months ago.       Income   Salary/wages, SS FDC, and Savings   Insurance      At this time, patient can afford medication costs:  Yes  [employer] [facility] OPEN ACCESS     Owen is able to keep his employer policy until July. He is 62 and not yet eligible for Medicare due to age or ESRD (not yet on dialysis). Owen is aware that he will need to  a private policy when his employer plan runs out until he is eligible for Medicare. He is working with his   on this piece.          Medical Status    Current Mode of Treatment for ESRD None   Complications None       Behavioral    Tobacco Use No Chemical Dependency No      Owen drinks about 3 glasses red wine/weekly. He uses THC in the edible form a few times/week.     Psychiatric Impairment Yes     Owen experiences some generalized anxiety. He is not on medication and does not see a therapist. He feels that symptoms are managed.       Reading Ability: Good  Education Level: Bachelors Degree Recent Legal History No      Coping Style/Strategies: golf,fishing, tennis       Ability to Adhere to Complex Medical Regime: Yes     Adherence History:        Education  _X_ Medicare  _X_ Rehabilitation  _X_ Donor issues  _X_ Community resources  _X_ Post discharge housing  _X_ Financial resources  _X_ Medical insurance options  _X_ Psych adjustment  _X_ Family adjustment  _X_ Health Care Directive Provided Education   Psychosocial Risks of Transplant Reviewed and Discussed:  _X_ Increased stress related to emotional,            family, social, employment or financial           situation  _X_ Effect on work and/or disability benefits  _X_ Effect on future health and life           insurance  _X_ Transplant outcome expectations may           not be met  _X_ Mental Health Risks: anxiety,           depression, PTSD, guilt, grief and           chronic fatigue     Notable Items:   None noted.       Final Evaluation/Assessment   Patient seemed to process information well. Appeared well informed, motivated and able to follow post transplant requirements. Behavior was appropriate during interview. Has adequate income and insurance coverage. Adequate social support. No major contraindications noted for transplant.  At this time patient appears to understand the risks and benefits of transplant.      Recommendation  Acceptable    Selection Criteria Met:  Plan for support Yes   Chemical Dependence Yes   Smoking Yes   Mental Health Yes    Adequate Finances Yes    Signature: [facility] LICSW   Title: Clinical

## 2023-10-30 NOTE — PROGRESS NOTES
Transplant Surgery Consult Note     Medical record number: [id number]  YOB: 1960,   Consult requested by Dr Epps for evaluation of kidney transplant candidacy.    61 yo with UC and CKD 4 unknown etio  Mild obesity  Abd diastasis  WIll stick with RLQ incision  No prev incisions  Blood type B  No blood transfusions    Risks of the surgical procedure including but not limited to the rare risk of mortality discussed in detail. Patient verbalized good understanding and had several pertinent questions which were answered satisfactorily.       Immunosuppressive regimen, management and long term risks discussed in detail.         Assessment and Recommendations:Mr. Fowler appears to be a good candidate for kidney transplantation and has a good understanding of the risks and benefits of this approach to the management of renal failure. The following issues should be addressed prior to finalizing his transplant candidacy:     Transplant order: Mr. Fowler has Chronic renal failure due to unknown etiology (no kidney biopsy) whose condition is not expected to resolve, is expected to progress, and is expected to continue to develop related comorbid conditions.  Recommend he be considered as a candidate for kidney.  Cardiology consult for cardiac risk stratification to be ordered: Yes  CT abdomen and pelvis without contrast to be ordered for assessment of vascular targets: Yes  Transplant listing labs ordered to include HLA, ABOx2, Cr, etc.  Dietician consult ordered: Yes  Social work consult ordered: Yes  Imaging reports reviewed:  yes  Radiology images reviewed:no  Recipient suitable to move forward with work up of living donors:  Yes      The majority of our visit was spent in counselling, discussing the medical and surgical risks of kidney transplantation. We discussed approximate wait time and how that is influenced by issues such as blood type and sensitization (PRA) and access to a living donor. I contrasted  potential waiting time for living vs  donor kidneys from  normal (0-85%) or higher (%) kidney donor profile index (KDPI) donors and their associated outcomes. I would not recommend this individual to consider kidneys from high KDPI donors. The reason for this decision is best summarized as: multiple potential living donors. Potential surgical complications of kidney transplantation include bleeding, superficial or deep wound complications (infection, hernia, lymphocele), ureteral anastomotic failure (leak or stenosis), graft thrombosis, need for reoperation and other issues such as cardiac complications, pneumonia, deep venous thrombosis, pulmonary embolism, post transplant diabetes and death. The potential for recurrent disease or need for retransplantation was also addressed. We discussed the possible need for ureteral stent (and subsequent removal), and the utility of protocol biopsy and laboratory studies to evaluate for rejection or recurrent disease. We discussed the risk of graft rejection, our center's average graft and patient survival rates, immunosuppression protocols, as well as the potential opportunity to participate in clinical trials.  We also discussed the average length of stay, recovery process, and posttransplant lab and monitoring protocol.  I emphasized the need for strict immunosuppression medication adherence and the potential for complications of immunosuppression such as skin cancer or lymphoma, as well as a very low but not zero risk of donor-derived disease transmission risks (infection, cancer). Mr. Fowler asked good questions and his candidacy will be reviewed at our Multidisciplinary Selection Committee. Thank you for the opportunity to participate in Mr. Fowler's care.      Total time: 60 minutes  Counselling time: 30 minutes    .  Tonja Ambrocio in Immunology and Transplantation  Surgical Director, Kidney & Pancreas Transplant Programs  Medical  Director, Solid Organ Transplant Unit    ---------------------------------------------------------------------------------------------------    HPI: Mr. Fowler has Chronic renal failure due to unknown etiology (no kidney biopsy). The patient is non-diabetic.       The patient is not on dialysis.    Has potential kidney donors:  Yes .  Interested in participation in paired exchange if a donor is willing: Yes     The patient has the following pertinent history:       No    Yes  Dialysis:    []      [] via:       Blood Transfusion                  []      []  Number of units:   Most recently:  Pregnancy:    []      [] Number:       Previous Transplant:  []      [] Details:    Cancer    []      [] Comment:   Kidney stones   []      [] Comment:      Recurrent infections  []      []  Type:                  Bladder dysfunction  []      [] Cause:    Claudication   []      [] Distance:    Previous Amputation  []      [] Cause:     Chronic anticoagulation  []      [] Indication:   Gnosticism  []      []      Past Medical History:   Diagnosis Date    Hypertension     Noninfectious ileitis     ulcerative colitis     Sleep apnea     c pap     Past Surgical History:   Procedure Laterality Date    ESOPHAGOSCOPY, GASTROSCOPY, DUODENOSCOPY (EGD), COMBINED N/A 10/16/2015    Procedure: COMBINED ENDOSCOPIC ULTRASOUND, ESOPHAGOSCOPY, GASTROSCOPY, DUODENOSCOPY (EGD), FINE NEEDLE ASPIRATE/BIOPSY;  Surgeon: Bambi Salgado MD;  Location:  GI    GENITOURINARY SURGERY  [address] [place] [address] UGI ENDOSCOPY W EUS N/A 10/02/2014    Procedure: COMBINED ENDOSCOPIC ULTRASOUND, ESOPHAGOSCOPY, GASTROSCOPY, DUODENOSCOPY (EGD);  Surgeon: Bambi Salgado MD;  Location:  OR    HEAD & NECK SURGERY      dental surg    ORTHOPEDIC SURGERY      nerve repair right hand, meniscus repair-right     Family History   Problem Relation Age of Onset    Atrial fibrillation Mother     Atrial fibrillation Father     CABG Maternal Grandfather  70    Kidney Disease No family hx of     Cancer No family hx of      Social History     Socioeconomic History    Marital status:      Spouse name: Not on file    Number of children: Not on file    Years of education: Not on file    Highest education level: Not on file   Occupational History    Not on file   Tobacco Use    Smoking status: Former     Types: Cigars    Smokeless tobacco: Never    Tobacco comments:     has one cigar every 3 mos   Substance and Sexual Activity    Alcohol use: Yes     Comment: 3-6 drinks per week    Drug use: No     Comment: THC gummies - sleep    Sexual activity: Not on file   Other Topics Concern    Not on file   Social History Narrative    Not on file     Social Determinants of Health     Financial Resource Strain: Not on file   Food Insecurity: Not on file   Transportation Needs: Not on file   Physical Activity: Not on file   Stress: Not on file   Social Connections: Not on file   Interpersonal Safety: Not on file   Housing Stability: Not on file       ROS:   CONSTITUTIONAL:  No fevers or chills  EYES: negative for icterus  ENT:  negative for hearing loss, tinnitus and sore throat  RESPIRATORY:  negative for cough, sputum, dyspnea  CARDIOVASCULAR:  negative for chest pain Fatigue  GASTROINTESTINAL:  negative for nausea, vomiting, diarrhea or constipation  GENITOURINARY:  negative for incontinence, dysuria, bladder emptying problems  HEME:  No easy bruising  INTEGUMENT:  negative for rash and pruritus  NEURO:  Negative for headache, seizure disorder  Allergies:   Allergies   Allergen Reactions    Nuts [Peanut-Containing Drug Products] Anaphylaxis     Medications:  Prescription Medications as of 10/30/2023         Rx Number Disp Refills Start End Last Dispensed Date Next Fill Date Owning Pharmacy    atorvastatin (LIPITOR) 80 MG tablet    9/12/2022 10/2/2024       Sig: Take 1 tablet by mouth daily    Class: Historical    Route: Oral    calcitRIOL (ROCALTROL) 0.25 MCG capsule     "5/8/2022        Sig: Take 0.25 mcg by mouth    Class: Historical    Route: Oral    cholecalciferol 50 MCG (2000 UT) CAPS    2/8/2023 2/8/2024       Sig: Take 2,000 Units by mouth    Class: Historical    Route: Oral    fluticasone (FLONASE) 50 MCG/ACT nasal spray            Sig: Spray 2 sprays into both nostrils daily    Class: Historical    Route: Both Nostrils    losartan (COZAAR) 25 MG tablet    9/14/2022        Sig: Take 0.5 tablets by mouth daily    Class: Historical    Route: Oral    mesalamine (CANASA) 1000 MG suppository            Sig: UNWRAP AND INSERT 1 SUPPOSITORY RECTALLY EVERY EVENING AS NEEDED    Class: Historical    metoprolol succinate ER (TOPROL XL) 25 MG 24 hr tablet    9/14/2022        Sig: Take 2 tablets by mouth daily    Class: Historical    Route: Oral    STELARA 90 MG/ML    7/19/2023        Class: Historical    ATENOLOL PO            Sig: Take 25 mg by mouth daily    Class: Historical    Route: Oral    cetirizine (ZYRTEC) 10 MG tablet            Sig: Take 20 mg by mouth daily    Class: Historical    Route: Oral    EPINEPHrine (EPIPEN) 0.3 MG/0.3ML injection  2 each 3 7/3/2015        Sig: Inject 0.3 mLs (0.3 mg) into the muscle once as needed    Route: Intramuscular    HYDROcodone-acetaminophen (NORCO) 5-325 MG per tablet  20 tablet 0 8/6/2016        Sig: Take 1-2 tablets by mouth every 4 hours as needed for moderate to severe pain    Class: Local Print    Earliest Fill Date: 8/6/2016    Route: Oral    InFLIXimab (REMICADE IV)            Sig: Iv infusion q8wks    Class: Historical    Route: Intravenous    tamsulosin (FLOMAX) 0.4 MG 24 hr capsule            Sig: Take by mouth daily Takes 2 tabs daily    Class: Historical    Route: Oral          Exam:     BP (!) 141/82   Pulse 78   Ht 1.765 m (5' 9.5\")   Wt 88.9 kg (195 lb 14.4 oz)   SpO2 99%   BMI 28.51 kg/m    Appearance: in no apparent distress.   Skin: normal  Eyes:  no redness or discharge.  Sclera anicteric  Head and Neck: Normal, no " "rashes or jaundice  Respiratory: easy respirations, no audible wheezing.  Abdomen: rounded, No surgical scars     Psychiatric: Normal mood and affect    Diagnostics:   No results found for this or any previous visit (from the past 672 hour(s)).  No results found for: \"CPRA\"   "

## 2023-10-31 LAB
ALBUMIN SERPL ELPH-MCNC: 4 G/DL (ref 3.7–5.1)
ALPHA1 GLOB SERPL ELPH-MCNC: 0.3 G/DL (ref 0.2–0.4)
ALPHA2 GLOB SERPL ELPH-MCNC: 0.6 G/DL (ref 0.5–0.9)
B-GLOBULIN SERPL ELPH-MCNC: 0.9 G/DL (ref 0.6–1)
CARDIOLIPIN IGG SER IA-ACNC: <2 GPL-U/ML
CARDIOLIPIN IGG SER IA-ACNC: NEGATIVE
CARDIOLIPIN IGM SER IA-ACNC: 2.5 MPL-U/ML
CARDIOLIPIN IGM SER IA-ACNC: NEGATIVE
CMV IGG SERPL IA-ACNC: <0.2 U/ML
CMV IGG SERPL IA-ACNC: NORMAL
DRVVT SCREEN RATIO: 0.92
EBV VCA IGG SER IA-ACNC: 521 U/ML
EBV VCA IGG SER IA-ACNC: POSITIVE
GAMMA GLOB SERPL ELPH-MCNC: 1.4 G/DL (ref 0.7–1.6)
GAMMA INTERFERON BACKGROUND BLD IA-ACNC: 0 IU/ML
HBV CORE AB SERPL QL IA: NONREACTIVE
HBV SURFACE AB SERPL IA-ACNC: 1.4 M[IU]/ML
HBV SURFACE AB SERPL IA-ACNC: NONREACTIVE M[IU]/ML
HBV SURFACE AG SERPL QL IA: NONREACTIVE
HCV AB SERPL QL IA: NONREACTIVE
HIV 1+2 AB+HIV1 P24 AG SERPL QL IA: NONREACTIVE
KAPPA LC FREE SER-MCNC: 9.37 MG/DL (ref 0.33–1.94)
KAPPA LC FREE/LAMBDA FREE SER NEPH: 1.24 {RATIO} (ref 0.26–1.65)
LA PPP-IMP: NEGATIVE
LAMBDA LC FREE SERPL-MCNC: 7.57 MG/DL (ref 0.57–2.63)
LUPUS INTERPRETATION: NORMAL
M PROTEIN SERPL ELPH-MCNC: 0.1 G/DL
M TB IFN-G BLD-IMP: NEGATIVE
M TB IFN-G CD4+ BCKGRND COR BLD-ACNC: 5.16 IU/ML
MITOGEN IGNF BCKGRD COR BLD-ACNC: 0 IU/ML
MITOGEN IGNF BCKGRD COR BLD-ACNC: 0.01 IU/ML
PROT PATTERN SERPL ELPH-IMP: ABNORMAL
PTT RATIO: 1.03
QUANTIFERON MITOGEN: 5.16 IU/ML
QUANTIFERON NIL TUBE: 0 IU/ML
QUANTIFERON TB1 TUBE: 0.01 IU/ML
QUANTIFERON TB2 TUBE: 0
THROMBIN TIME: 17.5 SECONDS (ref 13–19)
VZV IGG SER QL IA: 1047 INDEX
VZV IGG SER QL IA: POSITIVE

## 2023-11-02 LAB
A*: NORMAL
A*LOCUS SEROLOGIC EQUIVALENT: 1
A*LOCUS: NORMAL
A*SEROLOGIC EQUIVALENT: 31
ABTEST METHOD: NORMAL
ATRIAL RATE - MUSE: 81 BPM
B*: NORMAL
B*LOCUS SEROLOGIC EQUIVALENT: 7
B*LOCUS: NORMAL
B*SEROLOGIC EQUIVALENT: 51
BW-1: NORMAL
BW-2: NORMAL
C*: NORMAL
C*LOCUS SEROLOGIC EQUIVALENT: 7
C*LOCUS: NORMAL
C*SEROLOGIC EQUIVALENT: 15
DIASTOLIC BLOOD PRESSURE - MUSE: NORMAL MMHG
DPA1*: NORMAL
DPB1*: NORMAL
DQA1*: NORMAL
DQA1*LOCUS: NORMAL
DQB1*: NORMAL
DQB1*LOCUS SEROLOGIC EQUIVALENT: 5
DQB1*LOCUS: NORMAL
DQB1*SEROLOGIC EQUIVALENT: 6
DRB1*: NORMAL
DRB1*LOCUS SEROLOGIC EQUIVALENT: 1
DRB1*LOCUS: NORMAL
DRB1*SEROLOGIC EQUIVALENT: 15
DRB5*LOCUS SEROLOGIC EQUIVALENT: 51
DRB5*LOCUS: NORMAL
DRSSO TEST METHOD: NORMAL
INTERPRETATION ECG - MUSE: NORMAL
P AXIS - MUSE: 69 DEGREES
PR INTERVAL - MUSE: 156 MS
QRS DURATION - MUSE: 88 MS
QT - MUSE: 380 MS
QTC - MUSE: 441 MS
R AXIS - MUSE: 77 DEGREES
SYSTOLIC BLOOD PRESSURE - MUSE: NORMAL MMHG
T AXIS - MUSE: 29 DEGREES
VENTRICULAR RATE- MUSE: 81 BPM

## 2023-11-05 PROBLEM — N40.0 BPH (BENIGN PROSTATIC HYPERPLASIA): Status: ACTIVE | Noted: 2023-11-05

## 2023-11-05 PROBLEM — G47.33 OBSTRUCTIVE SLEEP APNEA: Status: ACTIVE | Noted: 2023-11-05

## 2023-11-05 PROBLEM — N18.4 CHRONIC KIDNEY DISEASE, STAGE 4, SEVERELY DECREASED GFR (H): Status: ACTIVE | Noted: 2023-11-05

## 2023-11-05 PROBLEM — D47.2 MGUS (MONOCLONAL GAMMOPATHY OF UNKNOWN SIGNIFICANCE): Status: ACTIVE | Noted: 2023-11-05

## 2023-11-05 PROBLEM — K51.90 ULCERATIVE COLITIS (H): Status: ACTIVE | Noted: 2023-11-05

## 2023-11-08 ENCOUNTER — COMMITTEE REVIEW (OUTPATIENT)
Dept: TRANSPLANT | Facility: CLINIC | Age: 63
End: 2023-11-08
Payer: COMMERCIAL

## 2023-11-08 LAB
PROTOCOL CUTOFF: NORMAL
SA 1 CELL: NORMAL
SA 1 TEST METHOD: NORMAL
SA 2 CELL: NORMAL
SA 2 TEST METHOD: NORMAL
SA1 HI RISK ABY: NORMAL
SA1 MOD RISK ABY: NORMAL
SA2 HI RISK ABY: NORMAL
SA2 MOD RISK ABY: NORMAL
UNACCEPTABLE ANTIGENS: NORMAL
UNOS CPRA: 64
ZZZSA 1  COMMENTS: NORMAL
ZZZSA 2 COMMENTS: NORMAL

## 2023-11-08 NOTE — COMMITTEE REVIEW
Abdominal Committee Review Note     Evaluation Date: 10/30/2023  Committee Review Date: 11/8/2023    Organ being evaluated for: Kidney    Transplant Phase: Evaluation  Transplant Status: Active    Transplant Coordinator: Roxie Herman  Transplant Surgeon: Dr. Leigh Salomon      Referring Physician: Genevieve Sanders    Primary Diagnosis: Hypertension  Secondary Diagnosis: MGUS    Committee Review Members:  Cardiology Silvia Lara, APRN CNP   Nephrology Jesus Nolan, APRN CNP, Tio Lewis MD   Nutrition Vianca Roper, EDU   Pharmacist Colleen Downey [facility]    - Clinical Dea Conrad, [facility]   Transplant MICHELLE BELL, RN, Sahra Alvarado, RN, Shannan Wolf, RN, Eli Montoya, RUSSELL, Eli Harvey, RN, Hardeep Hagan, BLAS, Ligia Luong, BLAS, Mariah Fernandez, RN   Transplant Surgery Leigh Salomon MD, MD       Transplant Eligibility: Irreversible chronic kidney disease treated w/dialysis or expected need for dialysis    Committee Review Decision: Approved    Relative Contraindications: None    Absolute Contraindications: None    Committee Chair Leigh Salomon MD verbally attested to the committee's decision.    Committee Discussion Details: Reviewed pt's medical status and evaluation results to date with multidisciplinary committee.    Recommended the following evaluation items:    #Cardiac Risk Assessment  #CT abdomen Pelvis   #Iliac US   #Outside oncology to comment on slight increase on monoclonal peak- needs immunofixation to find out what it is    Patient should have live donors register now to initiate donor evaluation: Yes    Committee determined that patient is a Excellent candidate for Kidney     Listing plan: List Inactive     Patient will be called and summary letter will be sent.

## 2023-11-09 ENCOUNTER — OFFICE VISIT (OUTPATIENT)
Dept: CARDIOLOGY | Facility: CLINIC | Age: 63
End: 2023-11-09
Attending: NURSE PRACTITIONER
Payer: COMMERCIAL

## 2023-11-09 ENCOUNTER — HOSPITAL ENCOUNTER (OUTPATIENT)
Dept: CT IMAGING | Facility: CLINIC | Age: 63
Discharge: HOME | End: 2023-11-09
Attending: NURSE PRACTITIONER
Payer: COMMERCIAL

## 2023-11-09 ENCOUNTER — TELEPHONE (OUTPATIENT)
Dept: TRANSPLANT | Facility: CLINIC | Age: 63
End: 2023-11-09
Payer: COMMERCIAL

## 2023-11-09 VITALS
SYSTOLIC BLOOD PRESSURE: 153 MMHG | HEART RATE: 84 BPM | BODY MASS INDEX: 28.22 KG/M2 | WEIGHT: 193.9 LBS | OXYGEN SATURATION: 97 % | DIASTOLIC BLOOD PRESSURE: 88 MMHG

## 2023-11-09 DIAGNOSIS — Z76.82 ORGAN TRANSPLANT CANDIDATE: ICD-10-CM

## 2023-11-09 DIAGNOSIS — N18.4 CHRONIC KIDNEY DISEASE, STAGE 4, SEVERELY DECREASED GFR (H): ICD-10-CM

## 2023-11-09 DIAGNOSIS — N18.4 CHRONIC KIDNEY DISEASE, STAGE IV (SEVERE) (H): ICD-10-CM

## 2023-11-09 DIAGNOSIS — Z01.818 PRE-TRANSPLANT EVALUATION FOR KIDNEY TRANSPLANT: ICD-10-CM

## 2023-11-09 DIAGNOSIS — D47.2 MGUS (MONOCLONAL GAMMOPATHY OF UNKNOWN SIGNIFICANCE): ICD-10-CM

## 2023-11-09 DIAGNOSIS — N18.4 CHRONIC KIDNEY DISEASE, STAGE 4, SEVERELY DECREASED GFR (H): Primary | ICD-10-CM

## 2023-11-09 PROCEDURE — 74176 CT ABD & PELVIS W/O CONTRAST: CPT

## 2023-11-09 PROCEDURE — 74176 CT ABD & PELVIS W/O CONTRAST: CPT | Mod: 26 | Performed by: RADIOLOGY

## 2023-11-09 PROCEDURE — 99203 OFFICE O/P NEW LOW 30 MIN: CPT | Performed by: INTERNAL MEDICINE

## 2023-11-09 PROCEDURE — 99214 OFFICE O/P EST MOD 30 MIN: CPT | Performed by: INTERNAL MEDICINE

## 2023-11-09 ASSESSMENT — PAIN SCALES - GENERAL: PAINLEVEL: NO PAIN (0)

## 2023-11-09 NOTE — LETTER
2023    Titus Fowler  [address] [address]      Dear Mr. Fowler,    This letter is sent to confirm that you are a candidate in the kidney transplant program at the [facility].  You were placed on the kidney inactive waitlist on 23.  This means you will accumulate waiting time but not receive  donor calls.       Items we will need from you:    We have received approval from you insurance company for the transplant procedure.  It is critical that you notify us if there is any change in your insurance.  It is also important that you familiarize yourself with the details of your specific insurance policy.  Our patient  is available to assist you if you should have any questions regarding your coverage.    An ALA or PRA blood sample may need to be sent here every 3 months to match you with  donors or any potential living donors.  If you need this testing, special mailing boxes (called mailers) will be sent to you directly from the Outreach Department.  You should take the physician order form and the  to your home laboratory when it is time to for this testing to be done.  Additional mailers can be obtained by calling the Transplant Office and asking to speak to a kidney .    During this waiting period, we may request additional periodic laboratory tests with your primary physician.  It will be your responsibility to remind your physician to forward your results to the Transplant Office.    We need to be kept informed of any changes in your medical condition such as:    changes in your medications,   significant changes in your health  significant infections (such as pneumonia or abscesses)  blood transfusions  any condition which requires hospitalization  any surgery  Initiation of dialysis    Remember to complete any routine cancer screening tests required before  your transplant.  This includes colonoscopy; prostrate screening for men, and mammogram and gynecologic testing for women, as well as dental work.  Your primary care clinic can assist you with arranging for these exams.  Remind your caregivers to forward copies of the records and final reports.    We want you to know that our program has physician and surgeon coverage 24 hours a day, 365 days a year. In addition, our transplant surgeons and physicians will not be on call for two or more transplant programs more than 30 miles apart unless the circumstances have been reviewed and approved by the United Network for Organ Sharing (UNOS) Membership and Professional Standards Committee (MPSC). Finally, our primary physician and primary surgeons are not designated as the primary surgeon or primary physician at more than 1 transplant hospital. If this coverage changes or there are substantial program changes, you will be notified in writing by letter.     Attached is a letter from UNOS that describes the services and information offered to patients by UNOS and the Organ Procurement and Transplantation Network (OPTN).    We appreciate having had the opportunity to participate in your care.  If you have questions, please feel free to call the Transplant Office at [phone] or [phone].      Sincerely,       Kidney Transplant Program    Enclosures: Telephone Contact List, Travel Resources, UNOS Letter, Waitlist Information Update, and While You Are Waiting  CC:   Care Team                        The Organ Procurement and Transplantation Network Toll-free patient services line:  [phone]  Your resource for organ transplant information    Staffed 8:30 am - 5:00 pm ET Monday - Friday Leave a message 24/7 to receive a call back    The Organ Procurement and Transplantation Network (OPTN) is the national transplant system. It makes the policies that decide how donated organs are matched to patients waiting for a  transplant. The OPTN:    Makes sure donated organs get matched to people on the transplant waiting list  Tells people about the donation and transplant processes  Makes sure that the public knows about the need for more organ and tissue donations    The OPTN has a free patient services line that you can call to:  Get more information about:  Organ donation and organ transplants  Donation and transplant policies  Get an information kit with:  A list of transplant hospitals  Waiting list information  Talk about any questions you may have about your transplant hospital or organ procurement organization. The staff will do their best to help you or point you to others who may help.  Find out how you can volunteer with the OPTN and help shape transplant policy     The patient services line number is: 5-449-537-8113    Patient services line staff CANNOT answer questions about your own medical care, including:  Waiting list status  Test results  Medical records  You will need to call your transplant hospital for this information.    The following websites have more information about transplantation and donation:    OPTN: https://optn.transplant.hrsa.gov/    For potential living donors and transplant recipients:    Living with transplant: https://www.transplantliving.org/    Living donation process: https://optn.transplant.hrsa.gov/living-donation/    Financial assistance: https://www.livingdonorassistance.org/    Transplantation data: https://www.srtr.org/    Organ donation: https://www.organdonor.gov/    Volunteer with the OPTN: https://optn.transplant.hrsa.gov/get-involved/

## 2023-11-09 NOTE — LETTER
Titus Fowler  [address] [address]                2023    Dear Mr. Fowler,    This letter is sent to confirm that you are a candidate in the kidney transplant program at the [facility].  You were placed on the kidney inactive waitlist on 23.  This means you will accumulate waiting time but not receive  donor calls.       Items we will need from you:    We have received approval from you insurance company for the transplant procedure.  It is critical that you notify us if there is any change in your insurance.  It is also important that you familiarize yourself with the details of your specific insurance policy.  Our patient  is available to assist you if you should have any questions regarding your coverage.    An ALA or PRA blood sample may need to be sent here every 3 months to match you with  donors or any potential living donors.  If you need this testing, special mailing boxes (called mailers) will be sent to you directly from the Outreach Department.  You should take the physician order form and the  to your home laboratory when it is time to for this testing to be done.  Additional mailers can be obtained by calling the Transplant Office and asking to speak to a kidney .    During this waiting period, we may request additional periodic laboratory tests with your primary physician.  It will be your responsibility to remind your physician to forward your results to the Transplant Office.    We need to be kept informed of any changes in your medical condition such as:    changes in your medications,   significant changes in your health  significant infections (such as pneumonia or abscesses)  blood transfusions  any condition which requires hospitalization  any surgery  Initiation of dialysis    Remember to complete any routine cancer screening tests  required before your transplant.  This includes colonoscopy; prostrate screening for men, and mammogram and gynecologic testing for women, as well as dental work.  Your primary care clinic can assist you with arranging for these exams.  Remind your caregivers to forward copies of the records and final reports.    We want you to know that our program has physician and surgeon coverage 24 hours a day, 365 days a year. In addition, our transplant surgeons and physicians will not be on call for two or more transplant programs more than 30 miles apart unless the circumstances have been reviewed and approved by the United Network for Organ Sharing (UNOS) Membership and Professional Standards Committee (MPSC). Finally, our primary physician and primary surgeons are not designated as the primary surgeon or primary physician at more than 1 transplant hospital. If this coverage changes or there are substantial program changes, you will be notified in writing by letter.     Attached is a letter from UNOS that describes the services and information offered to patients by UNOS and the Organ Procurement and Transplantation Network (OPTN).    We appreciate having had the opportunity to participate in your care.  If you have questions, please feel free to call the Transplant Office at [phone] or [phone].      Sincerely,   BLAS Faustin     Kidney Transplant Program    Enclosures: Telephone Contact List, Travel Resources, UNOS Letter, Waitlist Information Update, and While You Are Waiting  CC:   Care Team

## 2023-11-09 NOTE — NURSING NOTE
Chief Complaint   Patient presents with    Follow Up     Joao Ugalde- 62 year old presents for Cardiology clearance for Kidney Tx; echo and EKG done 10/30/23.       Vitals were taken, medications reconciled.    Gautam Alexandre, Facilitator   1:31 PM

## 2023-11-09 NOTE — PATIENT INSTRUCTIONS
"Cardiology Providers you saw during your visit:  Dr. Duff    Medication changes:  -None     Testing:   -Schedule EKG Treadmill Stress     Please call if you have :  1. Weight gain of more than 2 pounds in a day or 5 pounds in a week  2. Increased shortness of breath, swelling or bloating  3. Dizziness, lightheadedness   4. Any questions or concerns.       Follow the American Heart Association Diet and Lifestyle recommendations:  Limit saturated fat, trans fat, sodium, red meat, sweets and sugar-sweetened beverages. If you choose to eat red meat, compare labels and select the leanest cuts available.  Aim for at least 150 minutes of moderate physical activity or 75 minutes of vigorous physical activity - or an equal combination of both - each week.      During business hours: [phone], press option # 1 to schedule or leave a message for your care team      After hours, weekends or holidays: On Call Cardiologist- [phone]   option #4 and ask to speak to the on-call Cardiologist.       Lidia ODONNELL   Cardiology Care Coordinator - Heart Failure/ [facility] Clinic  [facility]   Questions and [phone]   First press #1 for the [place] and then press #4 for \"To send a message to your care team\"    "

## 2023-11-09 NOTE — LETTER
11/09/23        Titus Fowler  [address] [address]        Dear Titus,    It was a pleasure to see you recently for consideration of kidney transplantation. Your pre-transplant evaluation results were reviewed at our Multidisciplinary Selection Committee. The committee is requesting the following items are completed before listing you active on the transplant list:    Cardiac Risk Assessment- scheduled 11/9/23  CT abdomen/pelvis- scheduled 11/9/23  Aorto/Iliac Ultrasound- Schedulers will call to arrange  Follow-up with Oncologist to determine any work-up/additional testing of slight increase of monoclonal peak of MGUS. Roxie to coordinate this.     For any questions, please contact the Transplant Office at [phone].      Sincerely,  BLAS Faustin    Solid Organ Transplant  [facility]

## 2023-11-09 NOTE — TELEPHONE ENCOUNTER
Called patient to discuss results of selection committee. Committee approved inactive status listing pending the completion of these items:     1.Cardiac Risk   2. CT abdomen Pelvis   3.Iliac US   4. Outside oncologist to comment on slight increase on monoclonal peak    Patient currently on his way to meet with cards and get his CT completed, in good agreement with the plan and will call back once Iliac US is completed.

## 2023-11-09 NOTE — PROGRESS NOTES
Cardiology Clinic Note    HPI    Dear colleagues,     I had the pleasure of seeing Mr. Titus Fowler in the Cardiology clinic.  As you know, Mr. Titus Fowler is a pleasant 63 year old male with a past medical history of hypertension and benign prostatic hyperplasia which is led to stage IV chronic kidney disease and is being considered for kidney transplant.  I first met the patient in November 9, 2023.    He presents alone today.  Currently he is getting over a cold.  Does not have any chest discomfort or palpitations.  Will get some dyspnea on exertion with moderate amount of activity, such as playing tennis, doing a flight of stairs.  Has no lower extremity swelling.  Does not have any lightheadedness or dizziness.  No bleeding symptoms.    PAST MEDICAL HISTORY:  Patient Active Problem List   Diagnosis    Obstructive sleep apnea    MGUS (monoclonal gammopathy of unknown significance)    Chronic kidney disease, stage 4, severely decreased GFR (H)    BPH (benign prostatic hyperplasia)    Ulcerative colitis (H)      FAMILY HISTORY:  Family History   Problem Relation Age of Onset    Atrial fibrillation Mother     Atrial fibrillation Father     CABG Maternal Grandfather 70    Kidney Disease No family hx of     Cancer No family hx of      SOCIAL HISTORY:  Social History     Tobacco Use    Smoking status: Former     Types: Cigars    Smokeless tobacco: Never    Tobacco comments:     has one cigar every 3 mos   Substance Use Topics    Alcohol use: Yes     Comment: 3-6 drinks per week    Drug use: No     Comment: THC gummies - sleep      CURRENT MEDICATIONS:  Current Outpatient Medications   Medication Sig Dispense Refill    atorvastatin (LIPITOR) 80 MG tablet Take 1 tablet by mouth daily      calcitRIOL (ROCALTROL) 0.25 MCG capsule Take 0.25 mcg by mouth      cholecalciferol 50 MCG (2000 UT) CAPS Take 2,000 Units by mouth      fluticasone (FLONASE) 50 MCG/ACT nasal spray Spray 2 sprays into both nostrils daily       losartan (COZAAR) 25 MG tablet Take 0.5 tablets by mouth daily      metoprolol succinate ER (TOPROL XL) 25 MG 24 hr tablet Take 2 tablets by mouth daily      STELARA 90 MG/ML       mesalamine (CANASA) 1000 MG suppository UNWRAP AND INSERT 1 SUPPOSITORY RECTALLY EVERY EVENING AS NEEDED (Patient not taking: Reported on 11/9/2023)       EXAM:  BP (!) 153/88 (BP Location: Right arm, Patient Position: Chair, Cuff Size: Adult Regular)   Pulse 84   Wt 88 kg (193 lb 14.4 oz)   SpO2 97%   BMI 28.22 kg/m      General: appears comfortable, alert and articulate  Heart: regular, S1/S2, no murmur, estimated JVP 8  Lungs: clear, no rales or wheezing  Extremities: no edema  Neurological: normal speech and affect, no gross motor deficits    Weight  Wt Readings from Last 10 Encounters:   11/09/23 88 kg (193 lb 14.4 oz)   10/30/23 88.9 kg (195 lb 14.4 oz)   09/12/23 83.9 kg (185 lb)   08/06/16 86.2 kg (190 lb)   10/16/15 88.5 kg (195 lb)   10/07/14 83.9 kg (185 lb)   10/02/14 85.4 kg (188 lb 4.4 oz)       Testing/Procedures:  I personally visualized and interpreted:    Echocardiogram 10/30/23:  Global and regional left ventricular function is normal with an EF of 55-60%.  Right ventricular function, chamber size, wall motion, and thickness are normal.  Pulmonary artery systolic pressure is normal.  The inferior vena cava is normal.  Trivial pericardial effusion is present.    EKG 10/30/23: normal    Assessment and Plan:     In summary, past medical history of hypertension and benign prostatic hyperplasia which is led to stage IV chronic kidney disease and is being considered for kidney transplant.     His resting echocardiogram and EKG is normal.  We will get an EKG treadmill stress test.  If test is normal, patient has very low perioperative risk for major adverse cardiac events and can proceed with kidney transplant when the time is needed.    No change in medications for his hypertension and his dyslipidemia.    The patient  states understanding and is agreeable with plan.   Feel free to contact myself regarding questions or concerns.  It was a pleasure to see this patient today.    Aubrey Duff MD   of Medicine  Advanced Heart Failure and Transplant Cardiology      [facility]    Today's clinic visit entailed:  30 minutes spent on the date of the encounter doing chart review, history and exam, documentation and further activities per the note    The level of medical decision making during this visit was of moderate complexity.    ADDENDUM:  ECG EXERCISE STRESS TEST 11/17/23:  SUMMARY   102% maximal predicted HR achieved. The test was terminated due to leg fatigue.  Normal heart rate and blood pressure response to exercise.  The patient did not report angina during exercise.  No ECG evidence of ischemia. .  No supraventricular or ventricular arrhythmias.  Good exercise tolerance.    He may proceed with renal transplant surgery without further cardiac testing.

## 2023-11-09 NOTE — NURSING NOTE
Cardiac Testing: Patient given instructions regarding  Stress EKG treadmill. Discussed purpose, preparation, procedure and when to expect results reported back to the patient. Patient demonstrated understanding of this information and agreed to call with further questions or concerns.    Return Appointment:   -Follow-up will be dependent on test results.   Patient given instructions regarding scheduling next clinic visit. Patient demonstrated understanding of this information and agreed to call with further questions or concerns.    Patient stated he understood all health information given and agreed to call with further questions or concerns.     Lidia Jean-Baptiste RN

## 2023-11-09 NOTE — LETTER
11/9/2023      RE: Titus Fowler  [address] [id number]       Dear Colleague,    Thank you for the opportunity to participate in the care of your patient, Titus Fowler, at the [facility]. Please see a copy of my visit note below.    Cardiology Clinic Note    HPI    Dear colleagues,     I had the pleasure of seeing Mr. Titus Fowler in the Cardiology clinic.  As you know, Mr. Titus Fowler is a pleasant 63 year old male with a past medical history of hypertension and benign prostatic hyperplasia which is led to stage IV chronic kidney disease and is being considered for kidney transplant.  I first met the patient in November 9, 2023.    He presents alone today.  Currently he is getting over a cold.  Does not have any chest discomfort or palpitations.  Will get some dyspnea on exertion with moderate amount of activity, such as playing tennis, doing a flight of stairs.  Has no lower extremity swelling.  Does not have any lightheadedness or dizziness.  No bleeding symptoms.    PAST MEDICAL HISTORY:  Patient Active Problem List   Diagnosis    Obstructive sleep apnea    MGUS (monoclonal gammopathy of unknown significance)    Chronic kidney disease, stage 4, severely decreased GFR (H)    BPH (benign prostatic hyperplasia)    Ulcerative colitis (H)      FAMILY HISTORY:  Family History   Problem Relation Age of Onset    Atrial fibrillation Mother     Atrial fibrillation Father     CABG Maternal Grandfather 70    Kidney Disease No family hx of     Cancer No family hx of      SOCIAL HISTORY:  Social History     Tobacco Use    Smoking status: Former     Types: Cigars    Smokeless tobacco: Never    Tobacco comments:     has one cigar every 3 mos   Substance Use Topics    Alcohol use: Yes     Comment: 3-6 drinks per week    Drug use: No     Comment: THC gummies - sleep      CURRENT MEDICATIONS:  Current  Outpatient Medications   Medication Sig Dispense Refill    atorvastatin (LIPITOR) 80 MG tablet Take 1 tablet by mouth daily      calcitRIOL (ROCALTROL) 0.25 MCG capsule Take 0.25 mcg by mouth      cholecalciferol 50 MCG (2000 UT) CAPS Take 2,000 Units by mouth      fluticasone (FLONASE) 50 MCG/ACT nasal spray Spray 2 sprays into both nostrils daily      losartan (COZAAR) 25 MG tablet Take 0.5 tablets by mouth daily      metoprolol succinate ER (TOPROL XL) 25 MG 24 hr tablet Take 2 tablets by mouth daily      STELARA 90 MG/ML       mesalamine (CANASA) 1000 MG suppository UNWRAP AND INSERT 1 SUPPOSITORY RECTALLY EVERY EVENING AS NEEDED (Patient not taking: Reported on 11/9/2023)       EXAM:  BP (!) 153/88 (BP Location: Right arm, Patient Position: Chair, Cuff Size: Adult Regular)   Pulse 84   Wt 88 kg (193 lb 14.4 oz)   SpO2 97%   BMI 28.22 kg/m      General: appears comfortable, alert and articulate  Heart: regular, S1/S2, no murmur, estimated JVP 8  Lungs: clear, no rales or wheezing  Extremities: no edema  Neurological: normal speech and affect, no gross motor deficits    Weight  Wt Readings from Last 10 Encounters:   11/09/23 88 kg (193 lb 14.4 oz)   10/30/23 88.9 kg (195 lb 14.4 oz)   09/12/23 83.9 kg (185 lb)   08/06/16 86.2 kg (190 lb)   10/16/15 88.5 kg (195 lb)   10/07/14 83.9 kg (185 lb)   10/02/14 85.4 kg (188 lb 4.4 oz)       Testing/Procedures:  I personally visualized and interpreted:    Echocardiogram 10/30/23:  Global and regional left ventricular function is normal with an EF of 55-60%.  Right ventricular function, chamber size, wall motion, and thickness are normal.  Pulmonary artery systolic pressure is normal.  The inferior vena cava is normal.  Trivial pericardial effusion is present.    EKG 10/30/23: normal    Assessment and Plan:     In summary, past medical history of hypertension and benign prostatic hyperplasia which is led to stage IV chronic kidney disease and is being considered for  kidney transplant.     His resting echocardiogram and EKG is normal.  We will get an EKG treadmill stress test.  If test is normal, patient has very low perioperative risk for major adverse cardiac events and can proceed with kidney transplant when the time is needed.    No change in medications for his hypertension and his dyslipidemia.    The patient states understanding and is agreeable with plan.   Feel free to contact myself regarding questions or concerns.  It was a pleasure to see this patient today.    Aubrey Duff MD   of Medicine  Advanced Heart Failure and Transplant Cardiology      [facility]    Today's clinic visit entailed:  30 minutes spent on the date of the encounter doing chart review, history and exam, documentation and further activities per the note    The level of medical decision making during this visit was of moderate complexity.        Please do not hesitate to contact me if you have any questions/concerns.     Sincerely,     Aubrey Duff MD

## 2023-11-17 ENCOUNTER — HOSPITAL ENCOUNTER (OUTPATIENT)
Dept: ULTRASOUND IMAGING | Facility: CLINIC | Age: 63
Discharge: HOME | End: 2023-11-17
Attending: NURSE PRACTITIONER
Payer: COMMERCIAL

## 2023-11-17 ENCOUNTER — HOSPITAL ENCOUNTER (OUTPATIENT)
Dept: CARDIOLOGY | Facility: CLINIC | Age: 63
Discharge: HOME | End: 2023-11-17
Attending: INTERNAL MEDICINE
Payer: COMMERCIAL

## 2023-11-17 DIAGNOSIS — Z01.818 PRE-TRANSPLANT EVALUATION FOR KIDNEY TRANSPLANT: ICD-10-CM

## 2023-11-17 DIAGNOSIS — N18.4 CHRONIC KIDNEY DISEASE, STAGE 4, SEVERELY DECREASED GFR (H): ICD-10-CM

## 2023-11-17 DIAGNOSIS — N18.4 CHRONIC KIDNEY DISEASE, STAGE IV (SEVERE) (H): ICD-10-CM

## 2023-11-17 DIAGNOSIS — Z76.82 ORGAN TRANSPLANT CANDIDATE: ICD-10-CM

## 2023-11-17 DIAGNOSIS — D47.2 MGUS (MONOCLONAL GAMMOPATHY OF UNKNOWN SIGNIFICANCE): ICD-10-CM

## 2023-11-17 PROCEDURE — 93978 VASCULAR STUDY: CPT

## 2023-11-17 PROCEDURE — 93978 VASCULAR STUDY: CPT | Mod: 26 | Performed by: RADIOLOGY

## 2023-11-17 PROCEDURE — 93016 CV STRESS TEST SUPVJ ONLY: CPT | Performed by: INTERNAL MEDICINE

## 2023-11-17 PROCEDURE — 93017 CV STRESS TEST TRACING ONLY: CPT

## 2023-11-17 PROCEDURE — 93018 CV STRESS TEST I&R ONLY: CPT | Performed by: INTERNAL MEDICINE

## 2023-11-20 ENCOUNTER — TELEPHONE (OUTPATIENT)
Dept: TRANSPLANT | Facility: CLINIC | Age: 63
End: 2023-11-20
Payer: COMMERCIAL

## 2023-11-20 NOTE — TELEPHONE ENCOUNTER
Called patient's onocology provider to comment on patient's slight monoclonal protein increase on pre-transplant evaluation. Left a message on nurse line to see if there is any additional testing or work up for transplant that needs to be completed.

## 2023-11-21 ENCOUNTER — TEAM CONFERENCE (OUTPATIENT)
Dept: TRANSPLANT | Facility: CLINIC | Age: 63
End: 2023-11-21
Payer: COMMERCIAL

## 2023-11-21 NOTE — TELEPHONE ENCOUNTER
Image Review Meeting    ATTENDEES: Dr. Elias Rader, Emily Thomas, Shannan Wolf     IMAGES REVIEWED: CT Abdomen/Pelvis from 11/9/23 Aorto/Iliac US from 11/17/23.    DECISION: targets suitable for transplant     INCIDENTALS: No

## 2023-11-30 ENCOUNTER — DOCUMENTATION ONLY (OUTPATIENT)
Dept: TRANSPLANT | Facility: CLINIC | Age: 63
End: 2023-11-30
Payer: COMMERCIAL

## 2023-12-13 ENCOUNTER — TELEPHONE (OUTPATIENT)
Dept: TRANSPLANT | Facility: CLINIC | Age: 63
End: 2023-12-13
Payer: COMMERCIAL

## 2023-12-13 NOTE — TELEPHONE ENCOUNTER
Contacted patient to review the selection committee results from today (see selection committee encounter) Will currently remain inactive and approved to be activated pending donor approval and GFR <20.  Discussed the importance of keeping up to date in health maintenance items, and calling with any changes in health. Provided contact information for wait list team and they will be calling with next steps for transplant.

## 2023-12-13 NOTE — LETTER
12/14/23        Titus Fowler  [address] [address]        Dear Titus,    Your pre-transplant kidney evaluation results were reviewed at our Multidisciplinary Selection Committee on 12/13/23. The committee approved your follow-up evaluation items to be listed active on the kidney transplant list pending your GFR remaining below 20. Your new wait list coordinator Eli will be following up with you to discuss your next steps towards transplant.     For any questions, please contact the Transplant Office at [phone].      Sincerely,  BLAS Faustin     Solid Organ Transplant  [facility], [facility]

## 2023-12-14 ENCOUNTER — DOCUMENTATION ONLY (OUTPATIENT)
Dept: TRANSPLANT | Facility: CLINIC | Age: 63
End: 2023-12-14
Payer: COMMERCIAL

## 2023-12-18 ENCOUNTER — TELEPHONE (OUTPATIENT)
Dept: TRANSPLANT | Facility: CLINIC | Age: 63
End: 2023-12-18
Payer: COMMERCIAL

## 2023-12-18 NOTE — TELEPHONE ENCOUNTER
"Called pt to introduce myself as WL coordinator and encouraged pt to stay up on health maintenance and to let us know if insurance changes, contact info updates. Explained WL protocol for pt's status and when follow up is needed. Spoke with pt about his approved donor. Gave pt direct information and encouraged to reach out with any questions/concerns.       Called pt today to review the Kidney Paired Donation Recipient Consent (revision date: 3/10/2023) and the Advanced Donor Voucher Recipient Consent Version 3.7.     I answered questions regarding typical time waiting for a match offer in [facility] and informed them that ultimately waiting time is unknown and it could be 3 to 6 months even with preference in the NKR system as a voucher chase.       Reviewed details pertaining to the Paired Exchange program: National Kidney Registry (NKR).                                 Discussed with patient the matching procedure and logistics of NKR.  Including that the pair/indiviudal cannot choose their match.  Reviewed our program's policy regarding communication between recipient and their matched donor.  Informed pt that only if both parties are willing to exchange identities, we can facilitate that, but must require a waiver.  I reviewed that shipping of the kidney is a process that is monitored closely but is not without risk. In the event that the kidney is damaged or lost that the programs managing the exchange try to repair broken chains but there is no guarantee that they would receive a kidney.   Reviewed with the patient that at any time they can withdraw from the KPD program or refuse a match offer.       Pt was sent docusign via e-mail, and pt signed consent forms for KPD and TIMO.     Reviewed The Advanced Donation Program as follows:       \"ADP\" allows an acceptable (as determined by the transplant center) Intended Donor \"ID\" to donate their kidney via human organ paired donation (more commonly referred to as an " "exchange or a swap) before their Voucher Joseph \"VH\" receives a transplant. Once the ID's donation has occurred, the VH(s) may be activated by their transplant center for matching by the National Kidney Registry (NKR). I understand that my ID would like to participate in the ADP to donate a kidney to provide a voucher for me. This donation gives one Voucher Joseph an opportunity to receive a living donor kidney through the Standard Voucher option, in which the Voucher Joseph is likely to need a kidney transplant within 1 year. I consent to the release of my health, medical, and personal information to the NKR for the purpose of participating in the ADP. I authorize the NKR to disclose, share and use my health, medical and personal information in conducting the ADP, and I waive any and all privacy law claims in the use of this information as part of the ADP. I understand that this form must be completed and returned at least 3 weeks before the ID's scheduled surgery date. I am willing to undergo the identity verification process if I receive a kidney through this program. I understand that this Voucher is non-transferable, and non-assignable. The ADP program is unrelated to the [place]  donor system and participation in the ADP program does not give any wait time points for the VH in the  donor system. Should a voucher ever need to be redeemed, the patient is responsible for the transplant related costs.     We reviewed the circumstances which may prevent pt from receiving a kidney:     _ __ A situation whereby I become medically unable to undergo transplant surgery.  _ _ NKR's inability to find an acceptable compatible donor.  _ __ The NKR may unexpectedly shut down.  _ __ Having a blood type of \"O\" can often delay a match offer for 1 - 2 years or more after activation.  _ __ A sensitization event that increases your NKR cPRA (e.g. blood transfusion, pregnancy, prior transplant, etc.).  _ __ Having an " increased NKR cPRA can delay a match offer for 1 - 2 years or more after activation.  _ __ Having an NKR cPRA of = 99% may result in never finding a match.  _ __ Unforeseen circumstances such as an act of nature.  _ _ Your Transplant Center not accepting potential donors, once you are activated for matching.      Pt was sent docusign via e-mail, and pt signed the NKR Advanced Voucher Recipient Consent Version 3.7.     Patient's activation in NKR is pending their donor's advanced donation. Pt verbalized understanding of information and has no further questions. Encouraged to reach out if questions arise.        Eli Harvey, RN, BSN, MSN  Kidney Transplant Waitlist Coordinator

## 2024-02-28 ENCOUNTER — MEDICAL CORRESPONDENCE (OUTPATIENT)
Dept: HEALTH INFORMATION MANAGEMENT | Facility: CLINIC | Age: 64
End: 2024-02-28

## 2024-04-05 ENCOUNTER — TRANSFERRED RECORDS (OUTPATIENT)
Dept: HEALTH INFORMATION MANAGEMENT | Facility: CLINIC | Age: 64
End: 2024-04-05

## 2024-05-02 ENCOUNTER — TRANSFERRED RECORDS (OUTPATIENT)
Dept: HEALTH INFORMATION MANAGEMENT | Facility: CLINIC | Age: 64
End: 2024-05-02
Payer: COMMERCIAL

## 2024-05-06 ENCOUNTER — TRANSFERRED RECORDS (OUTPATIENT)
Dept: HEALTH INFORMATION MANAGEMENT | Facility: CLINIC | Age: 64
End: 2024-05-06
Payer: COMMERCIAL

## 2024-05-14 ENCOUNTER — DOCUMENTATION ONLY (OUTPATIENT)
Dept: TRANSPLANT | Facility: CLINIC | Age: 64
End: 2024-05-14
Payer: COMMERCIAL

## 2024-05-14 ENCOUNTER — TELEPHONE (OUTPATIENT)
Dept: TRANSPLANT | Facility: CLINIC | Age: 64
End: 2024-05-14
Payer: COMMERCIAL

## 2024-05-14 NOTE — TELEPHONE ENCOUNTER
Pt called in to touch base. Pt had colonoscopy with [facility], will request records. Pt states colitis is manageable and [facility] doctor would like him to get established with [facility] for post transplant care. Pt states that his son is almost ready to donate. Pt's GFR is 17 now and starting to feel uremic s/s. Will get pt active on the list now that GFR has dropped below 20. Will need insurance approval. Pt to update PRA.  Pt verbalized understanding of information and has no further questions. Encouraged to reach out if questions arise.

## 2024-05-15 ENCOUNTER — DOCUMENTATION ONLY (OUTPATIENT)
Dept: TRANSPLANT | Facility: CLINIC | Age: 64
End: 2024-05-15
Payer: COMMERCIAL

## 2024-05-15 ENCOUNTER — TELEPHONE (OUTPATIENT)
Dept: TRANSPLANT | Facility: CLINIC | Age: 64
End: 2024-05-15
Payer: COMMERCIAL

## 2024-05-15 DIAGNOSIS — Z76.82 ORGAN TRANSPLANT CANDIDATE: ICD-10-CM

## 2024-05-15 DIAGNOSIS — N18.6 ESRD (END STAGE RENAL DISEASE) (H): Primary | ICD-10-CM

## 2024-05-15 NOTE — TELEPHONE ENCOUNTER
Called patient to inform them of status change to ACTIVE on the Kidney alone list today 5/15/24. Status change letter and PRA orders to be mailed. Patient is in agreement with listing ACTIVE status.      Discussed:   - Pt is eligible for  donor offers and pt can receive calls 24 hours a day, 7 days a week, and on call staff need to be able to reach pt or one of emergency contacts within 30 minutes or they might skip over to next recipient on list.   -Please make sure your phone is charged at all times and your voicemail is not full   -Your transplant on call coordinator will give you directions about when and where you will need to come to the hospital for transplant  -The transplant coordinator will call you to discuss your current health status, the offer, and plans to move forward.  Some organ offer calls will require you to come to the hospital immediately; some may not be as time sensitive.  It may be possible for you to come to the hospital and, for various reasons, the transplant could be cancelled.  This is often called a  dry run .  - Reviewed the right to accept or decline offer, without penalty  - Expected length of surgical procedure is about 3-4 hours, expected inpatient length of stay post transplant is 3-4 days, and potential to stay locally post transplant. Offered resources for lodging in the area  - Verified contact information as documented in Epic. Confirmed emergency contact information  -Instructed patient about importance of having PRAs drawn every 3 months via: (Mailers/[facility]). Encouraged them to set reminder in their preferred calendar to stay on top of their quarterly labs  -Reminded pt to stay up on health maintenance and to call with any updates on their health status, insurance or contact information.   -Reminded pt to inform RNCC as soon as possible if they test positive for COVID.  -Donor website was provided     -Last PRA was 24      -Provided name and contact  information for additional questions or concerns.  Pt expressed excellent understanding of all and was in good agreement with the plan.

## 2024-05-16 ENCOUNTER — LAB (OUTPATIENT)
Dept: LAB | Facility: CLINIC | Age: 64
End: 2024-05-16
Payer: COMMERCIAL

## 2024-05-16 DIAGNOSIS — Z76.82 ORGAN TRANSPLANT CANDIDATE: ICD-10-CM

## 2024-05-16 DIAGNOSIS — N18.6 ESRD (END STAGE RENAL DISEASE) (H): ICD-10-CM

## 2024-05-16 PROCEDURE — 86832 HLA CLASS I HIGH DEFIN QUAL: CPT

## 2024-05-16 PROCEDURE — 86833 HLA CLASS II HIGH DEFIN QUAL: CPT

## 2024-05-16 PROCEDURE — 36415 COLL VENOUS BLD VENIPUNCTURE: CPT

## 2024-05-24 LAB
PROTOCOL CUTOFF: NORMAL
SA 1  COMMENTS: NORMAL
SA 1 CELL: NORMAL
SA 1 TEST METHOD: NORMAL
SA 2 CELL: NORMAL
SA 2 COMMENTS: NORMAL
SA 2 TEST METHOD: NORMAL
SA1 HI RISK ABY: NORMAL
SA1 MOD RISK ABY: NORMAL
SA2 HI RISK ABY: NORMAL
SA2 MOD RISK ABY: NORMAL
UNACCEPTABLE ANTIGENS: NORMAL
UNOS CPRA: 64

## 2024-07-24 ENCOUNTER — TRANSCRIBE ORDERS (OUTPATIENT)
Dept: OTHER | Age: 64
End: 2024-07-24

## 2024-07-24 DIAGNOSIS — K51.011 ULCERATIVE PANCOLITIS WITH RECTAL BLEEDING (H): Primary | ICD-10-CM

## 2024-08-21 ENCOUNTER — TELEPHONE (OUTPATIENT)
Dept: TRANSPLANT | Facility: CLINIC | Age: 64
End: 2024-08-21
Payer: COMMERCIAL

## 2024-08-21 DIAGNOSIS — Z76.82 ORGAN TRANSPLANT CANDIDATE: ICD-10-CM

## 2024-08-21 DIAGNOSIS — N18.6 ESRD (END STAGE RENAL DISEASE) (H): Primary | ICD-10-CM

## 2024-08-21 PROCEDURE — 99207 PR NO CHARGE COORDINATED CARE PS: CPT

## 2024-08-21 NOTE — PROGRESS NOTES
Pt called in stating they would like an updated SW visit, will place order for that and neph since he is due for RWL visits. Pt verbalized understanding of information and has no further questions. Encouraged to reach out if questions arise.

## 2024-08-21 NOTE — TELEPHONE ENCOUNTER
SW returned call from pt's wife, Magalie. She and Owen are requesting to meet with SW for emotional support. Writer relayed message that SW is unable to schedule own appointments and encouraged Magalie/Owen to reach out to kidney coordinator, Eli Harvey for support in scheduling appointment.       DYLON Adkins, [facility]  Outpatient Kidney/Pancreas/Auto Islet Transplant Program  Ph: [phone]

## 2024-08-23 NOTE — LETTER
August 23, 2024  Re: Titus Fowler  YOB: 1960    Dear Colleague,    Thank you for your referral to the [facility] TRANSPL[facility]. The patient was contacted and declined to schedule due to, no longer wanting to move forward with this process.    If you have any questions or concerns, please contact our office at Dept: [phone].        Sincerely,  [facility] TRANSPLANT [facility]

## 2024-09-05 ENCOUNTER — OFFICE VISIT (OUTPATIENT)
Dept: TRANSPLANT | Facility: CLINIC | Age: 64
End: 2024-09-05
Attending: NURSE PRACTITIONER
Payer: COMMERCIAL

## 2024-09-05 VITALS
BODY MASS INDEX: 28.24 KG/M2 | DIASTOLIC BLOOD PRESSURE: 91 MMHG | WEIGHT: 194 LBS | HEART RATE: 93 BPM | SYSTOLIC BLOOD PRESSURE: 138 MMHG | OXYGEN SATURATION: 95 %

## 2024-09-05 DIAGNOSIS — Z76.82 ORGAN TRANSPLANT CANDIDATE: ICD-10-CM

## 2024-09-05 DIAGNOSIS — N18.6 ESRD (END STAGE RENAL DISEASE) (H): ICD-10-CM

## 2024-09-05 DIAGNOSIS — I10 PRIMARY HYPERTENSION: Primary | ICD-10-CM

## 2024-09-05 DIAGNOSIS — K51.20 ULCERATIVE PROCTITIS WITHOUT COMPLICATION (H): ICD-10-CM

## 2024-09-05 PROCEDURE — 99213 OFFICE O/P EST LOW 20 MIN: CPT | Performed by: NURSE PRACTITIONER

## 2024-09-05 PROCEDURE — 99207 PR NO CHARGE COORDINATED CARE PS: CPT

## 2024-09-05 NOTE — LETTER
9/5/2024      Titus Fowler  [address] [address]      Dear Colleague,    Thank you for referring your patient, Titus Fowler, to the [facility]. Please see a copy of my visit note below.    TRANSPLANT NEPHROLOGY WAITLIST VISIT    Assessment and Plan:  # Kidney Transplant Wait List Evaluation: Patient is a good candidate overall. Patient should be inactive on the wait list given the following       Recommendations:  -Active proctitis on 5/2024 colonoscopy, needs GI clearance  -Due for annual lab monitoring of MGUS   -SW for current financial changes  -Establish with a general nephrologist and PCP     # CKD from unclear etiology, but likely from longstanding hypertension and bladder outlet obstruction: Progressive disease since 2018 with recent eGFRs of 17-18. When ready, he would likely benefit from a kidney transplant      # HTN:  -140s. On Metoprolol and Los[place]aan. In process of transitioning to a new general nephrologist.    # Cardiac Risk:   HTN, CKD. Cardiac risk assessment 11/23: echocardiogram and EKG is normal. EKG treadmill stress test, no ischemia. Cleared for transplant without further cardiac testing.      # BPH: S/p laser vaporization with significant symptom improvement. PVR 5 ml.      # PAD Screening: per surgery      # MGUS: normal kappa/lambda ratio 1.07 and no monoclonal peak - as last oncology visit in August 2023. Benign bone marrow exam with normal flow cytometry, followed by outside oncology and recently see in August 2021. Monitor yearly. Continue to follow up with oncology       5/2020 BMB: Bone marrow cellularity 50% with adequate trilineage hematopoiesis and 2% polytypic plasma cells. No evidence of a lymphomatous infiltrate      # Ulcerative Colitis:  5/2024 colonoscopy with ulcerative proctitis, Noriega Score 2, up to 18cm. Previously followed by [facility].  Had been on Imuran and Remicade.Transitioned to Stelara about 1.5 years ago with  GI decreasing the frequency to injections Q4 weeks due to persistent symptoms of urgency and rectal bleeding. Also on budesonide 3mg TID. Plans to transition to [facility], has an appointment in 2 weeks with .     #PAUL: often wears mask ~4 hrs/night. Removes when wakes in the night.    # Health Maintenance: Colonoscopy: Up to date and Dental: Up to date     Discussed the risks and benefits of a transplant, including the risk of surgery and immunosuppression medications.     Patient s overall re-evaluation may require further discussion in the Transplant Program s multidisciplinary selection committee for a final recommendation on the patient s suitability for transplant.     Reason for Visit:  Titus Fowler is a 63 year old male with CKD from unknown etiology, presumed Hypertension, who presents for kidney transplant wait list evaluation.     Date of Initial Transplant Evaluation:  10/30/23        Current Transplant Phase: Waitlist: Active  Official UNOS Listing Date: 11/9/2023  Blood Type: B+        cPRA: 64       Date of cPRA: 5/16/24  Transplant Coordinator: [facility] Office phone number [phone]     Previous Medical Issues:  None     History of Present Illness:   Titus Fowler is a 63 year old male with history of CKD from unclear etiology, but likely from longstanding hypertension and bladder outlet obstruction. History of HTN since  his 40s.  Found to have an elevated Cr  ~ 1.7 mg/dl in 2017 in the setting of BPH and urinary retention for which he underwent laser vaporization of the prostate in 2018. Creatinine baseline was then 1.9 to 2.1 mg/dl since September of 2018 -> 2.5-2.7 mg/dl in 2023 ->3.4-3.7 mg/dl since February 2023  with correlating eGFRs of 17-18.          Interim Events:        Now retired and Cobra has run out. In process of changing over all providers. Notes he has persistent rectal bleeding and urgency despite increasing the frequency of his Stelara. 5/2024  colonoscopy with ulcerative proctitis, Noriega Score 2, up to 18cm.         Kidney Disease:        Kidney Disease Dx: Hypertension and bladder outlet obstruction       On Dialysis: No       Primary Nephrologist: Previously Dr. Epps, not yet established with Rhianna Fuchs in Jan 2025           Cardiac/Vascular Disease History:       Known CAD: No       Known PAD/Claudication Symptoms: No       Known Heart Failure: No       Arrhythmia:  No       Pulmonary Hypertension: No        Valvular Disease: No       Other: None       New Cardiac/Vascular Events: No         Functional Capacity/Frailty:        Retired . Admits to not as active as previously. Endorses more fatigue and GI symptoms limiting more physical activities. Enjoys going to the cabin, teaching grandkids to iceskate/play hockey. No exertional symptoms.     #Identified Care Partner Post Transplant Surgery: Wife, Alicia    Allergy Testing Questions:   Medication that caused a reaction None   Antibiotics used that didn't give an allergic reaction?  Patient doesn't know    COVID Vaccination Up To Date: Not asked       Other Pertinent Transplant Surgical Issues:  Recent Blood Transfusion: No  Previous Abdominal Transplant: No  Bladder Dysfunction: Hx of BPH, resolved  Chronic/Recurrent Infections: No  Chronic Anticoagulation: No  Jehovah s Witness: No       Active Problem List:   Patient Active Problem List   Diagnosis     Obstructive sleep apnea     MGUS (monoclonal gammopathy of unknown significance)     Chronic kidney disease, stage 4, severely decreased GFR (H)     BPH (benign prostatic hyperplasia)     Ulcerative colitis (H)     Hypertension       Personal History:  Nonsmoker     Allergies:  Allergies   Allergen Reactions     Nuts [Peanut-Containing Drug Products] Anaphylaxis        Medications:  Current Outpatient Medications   Medication Sig Dispense Refill     atorvastatin (LIPITOR) 80 MG tablet Take 1 tablet by mouth daily        calcitRIOL (ROCALTROL) 0.25 MCG capsule Take 0.25 mcg by mouth       fluticasone (FLONASE) 50 MCG/ACT nasal spray Spray 2 sprays into both nostrils daily       losartan (COZAAR) 25 MG tablet Take 0.5 tablets by mouth daily       metoprolol succinate ER (TOPROL XL) 25 MG 24 hr tablet Take 2 tablets by mouth daily       STELARA 90 MG/ML        mesalamine (CANASA) 1000 MG suppository UNWRAP AND INSERT 1 SUPPOSITORY RECTALLY EVERY EVENING AS NEEDED (Patient not taking: Reported on 11/9/2023)       No current facility-administered medications for this visit.        Vitals:  BP (!) 138/91   Pulse 93   Wt 88 kg (194 lb)   SpO2 95%   BMI 28.24 kg/m       Exam:  GENERAL APPEARANCE: alert and no distress  HENT: mouth without ulcers or lesions  LYMPHATICS: no cervical or supraclavicular nodes  RESP: lungs clear to auscultation - no rales, rhonchi or wheezes  CV: regular rhythm, normal rate, no rub, no murmur  EDEMA: no LE edema bilaterally  ABDOMEN: soft, nondistended, nontender, bowel sounds normal  MS: extremities normal - no gross deformities noted, no evidence of inflammation in joints, no muscle tenderness  SKIN: no rash    I spent a total of 45 minutes on the date of the encounter doing chart review, performing a history and physical exam, completing documentation and any further activities as noted above.         Again, thank you for allowing me to participate in the care of your patient.        Sincerely,        Johanne Torres NP

## 2024-09-05 NOTE — PROGRESS NOTES
TRANSPLANT NEPHROLOGY WAITLIST VISIT    Assessment and Plan:  # Kidney Transplant Wait List Evaluation: Patient is a good candidate overall. Patient should be inactive on the wait list given the following       Recommendations:  -Active proctitis on 5/2024 colonoscopy, needs GI clearance  -Due for annual lab monitoring of MGUS   -SW for current financial changes  -Establish with a general nephrologist and PCP     # CKD from unclear etiology, but likely from longstanding hypertension and bladder outlet obstruction: Progressive disease since 2018 with recent eGFRs of 17-18. When ready, he would likely benefit from a kidney transplant      # HTN:  -140s. On Metoprolol and Los[place]aan. In process of transitioning to a new general nephrologist.    # Cardiac Risk:   HTN, CKD. Cardiac risk assessment 11/23: echocardiogram and EKG is normal. EKG treadmill stress test, no ischemia. Cleared for transplant without further cardiac testing.      # BPH: S/p laser vaporization with significant symptom improvement. PVR 5 ml.      # PAD Screening: per surgery      # MGUS: normal kappa/lambda ratio 1.07 and no monoclonal peak - as last oncology visit in August 2023. Benign bone marrow exam with normal flow cytometry, followed by outside oncology and recently see in August 2021. Monitor yearly. Continue to follow up with oncology       5/2020 BMB: Bone marrow cellularity 50% with adequate trilineage hematopoiesis and 2% polytypic plasma cells. No evidence of a lymphomatous infiltrate      # Ulcerative Colitis:  5/2024 colonoscopy with ulcerative proctitis, Noriega Score 2, up to 18cm. Previously followed by [facility].  Had been on Imuran and Remicade.Transitioned to Stelara about 1.5 years ago with GI decreasing the frequency to injections Q4 weeks due to persistent symptoms of urgency and rectal bleeding. Also on budesonide 3mg TID. Plans to transition to [facility], has an appointment in 2 weeks with .     #PALU: often wears  mask ~4 hrs/night. Removes when wakes in the night.    # Health Maintenance: Colonoscopy: Up to date and Dental: Up to date     Discussed the risks and benefits of a transplant, including the risk of surgery and immunosuppression medications.     Patient s overall re-evaluation may require further discussion in the Transplant Program s multidisciplinary selection committee for a final recommendation on the patient s suitability for transplant.     Reason for Visit:  Titus Fowler is a 63 year old male with CKD from unknown etiology, presumed Hypertension, who presents for kidney transplant wait list evaluation.     Date of Initial Transplant Evaluation:  10/30/23        Current Transplant Phase: Waitlist: Active  Official UNOS Listing Date: 11/9/2023  Blood Type: B+        cPRA: 64       Date of cPRA: 5/16/24  Transplant Coordinator: Eli [facility] Transplant Office phone number [phone]     Previous Medical Issues:  None     History of Present Illness:   Titus Fowler is a 63 year old male with history of CKD from unclear etiology, but likely from longstanding hypertension and bladder outlet obstruction. History of HTN since  his 40s.  Found to have an elevated Cr  ~ 1.7 mg/dl in 2017 in the setting of BPH and urinary retention for which he underwent laser vaporization of the prostate in 2018. Creatinine baseline was then 1.9 to 2.1 mg/dl since September of 2018 -> 2.5-2.7 mg/dl in 2023 ->3.4-3.7 mg/dl since February 2023  with correlating eGFRs of 17-18.          Interim Events:        Now retired and Cobra has run out. In process of changing over all providers. Notes he has persistent rectal bleeding and urgency despite increasing the frequency of his Stelara. 5/2024 colonoscopy with ulcerative proctitis, Noriega Score 2, up to 18cm.         Kidney Disease:        Kidney Disease Dx: Hypertension and bladder outlet obstruction       On Dialysis: No       Primary Nephrologist: Previously Dr. Epps, not yet  established with Rhianna Fuchs in Jan 2025           Cardiac/Vascular Disease History:       Known CAD: No       Known PAD/Claudication Symptoms: No       Known Heart Failure: No       Arrhythmia:  No       Pulmonary Hypertension: No        Valvular Disease: No       Other: None       New Cardiac/Vascular Events: No         Functional Capacity/Frailty:        Retired . Admits to not as active as previously. Endorses more fatigue and GI symptoms limiting more physical activities. Enjoys going to the cabin, teaching grandkids to iceskate/play hockey. No exertional symptoms.     #Identified Care Partner Post Transplant Surgery: Wife, Alicia    Allergy Testing Questions:   Medication that caused a reaction None   Antibiotics used that didn't give an allergic reaction?  Patient doesn't know    COVID Vaccination Up To Date: Not asked       Other Pertinent Transplant Surgical Issues:  Recent Blood Transfusion: No  Previous Abdominal Transplant: No  Bladder Dysfunction: Hx of BPH, resolved  Chronic/Recurrent Infections: No  Chronic Anticoagulation: No  Jehovah s Witness: No       Active Problem List:   Patient Active Problem List   Diagnosis    Obstructive sleep apnea    MGUS (monoclonal gammopathy of unknown significance)    Chronic kidney disease, stage 4, severely decreased GFR (H)    BPH (benign prostatic hyperplasia)    Ulcerative colitis (H)    Hypertension       Personal History:  Nonsmoker     Allergies:  Allergies   Allergen Reactions    Nuts [Peanut-Containing Drug Products] Anaphylaxis        Medications:  Current Outpatient Medications   Medication Sig Dispense Refill    atorvastatin (LIPITOR) 80 MG tablet Take 1 tablet by mouth daily      calcitRIOL (ROCALTROL) 0.25 MCG capsule Take 0.25 mcg by mouth      fluticasone (FLONASE) 50 MCG/ACT nasal spray Spray 2 sprays into both nostrils daily      losartan (COZAAR) 25 MG tablet Take 0.5 tablets by mouth daily      metoprolol succinate ER  (TOPROL XL) 25 MG 24 hr tablet Take 2 tablets by mouth daily      STELARA 90 MG/ML       mesalamine (CANASA) 1000 MG suppository UNWRAP AND INSERT 1 SUPPOSITORY RECTALLY EVERY EVENING AS NEEDED (Patient not taking: Reported on 11/9/2023)       No current facility-administered medications for this visit.        Vitals:  BP (!) 138/91   Pulse 93   Wt 88 kg (194 lb)   SpO2 95%   BMI 28.24 kg/m       Exam:  GENERAL APPEARANCE: alert and no distress  HENT: mouth without ulcers or lesions  LYMPHATICS: no cervical or supraclavicular nodes  RESP: lungs clear to auscultation - no rales, rhonchi or wheezes  CV: regular rhythm, normal rate, no rub, no murmur  EDEMA: no LE edema bilaterally  ABDOMEN: soft, nondistended, nontender, bowel sounds normal  MS: extremities normal - no gross deformities noted, no evidence of inflammation in joints, no muscle tenderness  SKIN: no rash    I spent a total of 45 minutes on the date of the encounter doing chart review, performing a history and physical exam, completing documentation and any further activities as noted above.

## 2024-09-05 NOTE — PROGRESS NOTES
"Transplant Social Work Services Progress Note      Date of Initial Social Work Evaluation: 10/30/23  Collaborated with: Johanne Torres NP    Data: SW met with Owen and his wife, Magalie, for a social support visit.   Intervention: active listening, psychoeducation, validation, normalizing feelings or emotions.   Assessment: Owen expressed having concerns related to his ulcerative colitis and how it, at times, impacts his day-to-day or social activities. He refers to this as his \"plumbing issues.\" Owen mentions that he is not always confident or comfortable doing activities he once did due to his colitis. Magalie, Owen's wife, expresses concern regarding Owen's mental health, acknowledging that she realizes it can effect his physical health can impact his mental health. She encouraged Owen to openly communicate with her regarding how he is feeling, especially when he declines wanting to participate in a night out, social outing, etc. Owen denies having any acute depression or anxiety. SW validated Owen's desire to tell friends/family the level of details regarding his health issues based on his comfort level. SW encouraged ongoing communication between Owen and Magalie during this time.   Owen remains hopeful that his ulcerative colitis does not negatively impact his opportunity to undergo kidney transplant. Owen reports that his son, who lives in [place], has been approved to donate through the paired exchange program, pending weight loss.   Owen remains hopeful and optimistic that he will be able to resolve or lessen his ulcerative colitis symptoms and receive a kidney, in order to then live out he and his wife's [facility] goals/dreams.     Plan:  Discharge Plans in Progress: NA  Barriers to d/c plan: NA  Follow up Plan: [facility] provided Owen and Magalie with business card. SW will remain available as needed.       DYLON Adkins, [facility]  Outpatient Kidney/Pancreas/[facility] Islet Transplant " Program  Ph: [phone]

## 2024-09-06 ENCOUNTER — TELEPHONE (OUTPATIENT)
Dept: NEPHROLOGY | Facility: CLINIC | Age: 64
End: 2024-09-06
Payer: COMMERCIAL

## 2024-09-06 DIAGNOSIS — D64.9 ANEMIA: ICD-10-CM

## 2024-09-06 DIAGNOSIS — E55.9 VITAMIN D DEFICIENCY: ICD-10-CM

## 2024-09-06 DIAGNOSIS — N18.4 CHRONIC KIDNEY DISEASE, STAGE 4, SEVERELY DECREASED GFR (H): Primary | ICD-10-CM

## 2024-09-06 DIAGNOSIS — D47.2 MGUS (MONOCLONAL GAMMOPATHY OF UNKNOWN SIGNIFICANCE): ICD-10-CM

## 2024-09-06 NOTE — TELEPHONE ENCOUNTER
Health Call Center    Phone Message    May a detailed message be left on voicemail: yes     Reason for Call: Other: Pt had to switch to [facility] due to insurance. Pt had standing orders for labs every 2 months with his provider at [facility]. Pt is wondering if those orders could be placed by Dr. Rand until he can be seen. Pt also requested message be sent to see about a sooner appt as he is a transplant pt and he is worried about pausing care for so long.      Action Taken: Other: neph    Travel Screening: Not Applicable     Date of Service:

## 2024-09-09 NOTE — CONFIDENTIAL NOTE
Letter from 8/23/2024 retracted and removed from patient chart.  Letter sent in error to patient, he remains part of the transplant program.

## 2024-09-11 NOTE — TELEPHONE ENCOUNTER
Writer called patient. He is transferring care to [facility] due to insurance change. Writer helped him schedule an appt for 9.24 at 11 am with DR. Valverde. Also set up lab appt.

## 2024-09-16 ENCOUNTER — OFFICE VISIT (OUTPATIENT)
Dept: GASTROENTEROLOGY | Facility: CLINIC | Age: 64
End: 2024-09-16
Attending: INTERNAL MEDICINE
Payer: COMMERCIAL

## 2024-09-16 VITALS
DIASTOLIC BLOOD PRESSURE: 86 MMHG | BODY MASS INDEX: 28.24 KG/M2 | WEIGHT: 194 LBS | SYSTOLIC BLOOD PRESSURE: 132 MMHG | OXYGEN SATURATION: 97 % | HEART RATE: 84 BPM | RESPIRATION RATE: 20 BRPM

## 2024-09-16 DIAGNOSIS — K51.011 ULCERATIVE PANCOLITIS WITH RECTAL BLEEDING (H): Primary | ICD-10-CM

## 2024-09-16 PROCEDURE — 99204 OFFICE O/P NEW MOD 45 MIN: CPT | Performed by: INTERNAL MEDICINE

## 2024-09-16 RX ORDER — DICYCLOMINE HYDROCHLORIDE 10 MG/1
20 CAPSULE ORAL
Qty: 480 CAPSULE | Refills: 0 | Status: SHIPPED | OUTPATIENT
Start: 2024-09-16 | End: 2024-11-15

## 2024-09-16 RX ORDER — MESALAMINE 1000 MG/1
1000 SUPPOSITORY RECTAL AT BEDTIME
Qty: 90 SUPPOSITORY | Refills: 3 | Status: SHIPPED | OUTPATIENT
Start: 2024-09-16 | End: 2025-09-11

## 2024-09-16 NOTE — NURSING NOTE
Chief Complaint   Patient presents with    New Patient      IBD        Vitals:    09/16/24 0903   BP: 132/86   BP Location: Left arm   Patient Position: Sitting   Cuff Size: Adult Regular   Pulse: 84   Resp: 20   SpO2: 97%   Weight: 88 kg (194 lb)       Body mass index is 28.24 kg/m .

## 2024-09-16 NOTE — PROGRESS NOTES
IBD CLINIC VISIT     CC/REFERRING MD:  Zeke Vick  REASON FOR CONSULTATION: Ulcerative colitis    ASSESSMENT/PLAN  63 year old male with ulcerative colitis.     1. Ulcerative colitis:   Current medications:     - Stelara, June 2023, dose escalated to q4 in summer 2024    - Canasa PRN    - Budesonide 9mg per day  Current clinical disease activity: pMayo score 2/9 (mild disease)  Last endoscopic disease activity: eMayo 3 to 18cm    Patient with recent moderate proctitis now with a clinical response to dose escalation of Ustekinumab to every 4 weeks.  He remains on budesonide.  Plan at this point is to taper off steroids, start routine rectal therapy with Canasa and reevaluate endoscopic response to therapy in 2 months.    Should he have ongoing inflammation with Canasa, recommend transition to tacrolimus DC as long as he has continued isolated proctitis.    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  While there is some slight optimization we are working on, he is overall not at a high risk for postoperative inflammatory bowel disease flare.    -- Continue ustekinumab 90mg q4 weeks. This can be continued in the post transplant setting.   -- Taper off budesonide  -- Start canasa DC daily.   -- Flex sig in 2 months.     2. IBD dysplasia surveillance:   Given > 1/3 of colon involved he should have routine IBD dysplasia surveillance.  This is up to date as of the summer 2024.  Plan on 1 to 3 years based on symptoms, and transplant status.    Return to clinic in 4-6 months    This note was created with voice recognition software, and while reviewed for accuracy, typos may remain.     Jaron Wang MD MS  Associate Professor of Medicine  Inflammatory Bowel Disease Program   Division of Gastroenterology, Hepatology and Nutrition  [facility]  Pager: [phone]      IBD HISTORY  Age at diagnosis: 40  Extent of endoscopic disease: Left  sided   Extent of histologic disease:   Prior UC surgeries: None  Prior IBD Medications:   - Rowasa / Canasa   - Infliximab - dose escalated to q6 weeks. Transitioned to Stelara    DRUG MONITORING  TPMT enzyme activity:     6-TGN/6-MMPN levels:    Biologic concentration:    HPI:   UC history - symptoms started in 40s. From 40s to 50s he was just on Asacol and oral medications. But around  he had a flare requiring hospitalization and steroids.     He also had pancreatitis. He recalls there was a cyst in the pancreas. Follow-up EUS without evidence of cyst. Likely pseudocyst or other fluid collection.     THen started on Remicade, which was very benficial for years. THen incovid time, was having rectal bleeding and had a hemorrhoidectomy.     In , he stopped responding to remicade.     In 2023 changed to Stelara. After 10 months didn't really have relief. Had level testing and is now on q4 week stelara dosing. On this regimen, he thinks that things are better, more formed stool. Still has ongoing bleeding.     In past year, he has had two courses of prednisone.     He things colonoscopy was normal [id number].     10/2021: emayo 3 for 10cm  2022: email 3 for 10 cm    Getting a live donor - after son donates, 3-6 months he could get a donor.     Currently, about 1-2 months ago was having blood in every stool, lots of urgency. Did use steroid enema. Was having 3-4 stools per day.     Currently having 2-3 stools per day, typically in AM. Stools are formed. Recently, having very little blood.     Noriega score:   Stool freq: 0 (baseline stools frequency)  Rectal bleedin (Visible blood less than half of the time)  PGA: 1 (Mild)  Endoscopy: Not done on q4 week stelara.     Extra intestinal manifestations of IBD:  No uveitis/episcleritis  No aphthous ulcers   No arthritis   No erythema nodosum/pyoderma gangrenosum.     sIBDQ:  IBDQ Score Date IBDQ - Total Score  (Higher score better)   2024   2:21  PM 38        ROS:    Constitutional, HEENT, cardiovascular, pulmonary, GI, , musculoskeletal, neuro, skin, endocrine and psych systems are negative, except as otherwise noted.    PHYSICAL EXAMINATION:  Constitutional: aaox3, cooperative, pleasant, not dyspneic/diaphoretic, no acute distress  Vitals reviewed: /86 (BP Location: Left arm, Patient Position: Sitting, Cuff Size: Adult Regular)   Pulse 84   Resp 20   Wt 88 kg (194 lb)   SpO2 97%   BMI 28.24 kg/m    Wt:   Wt Readings from Last 2 Encounters:   09/16/24 88 kg (194 lb)   09/05/24 88 kg (194 lb)      Constitutional - general appearance is well and in no acute distress. Body habitus normal  Eyes - No redness or discharge  Respiratory - No cough, unlabored breathing  Musculoskeletal - range of motion intact: Neck and arms  Skin - No discoloration or lesions  Neurological - No tremors, headaches  Psychiatric - No anxiety, alert & oriented     PERTINENT PAST MEDICAL HISTORY:  Past Medical History:   Diagnosis Date    BPH (benign prostatic hyperplasia)     Chronic kidney disease, stage 4, severely decreased GFR (H) 11/05/2023    Hypertension     MGUS (monoclonal gammopathy of unknown significance)     Noninfectious ileitis     ulcerative colitis     Sleep apnea     c pap    Ulcerative colitis (H)        PREVIOUS SURGERIES:  Past Surgical History:   Procedure Laterality Date    ESOPHAGOSCOPY, GASTROSCOPY, DUODENOSCOPY (EGD), COMBINED N/A 10/16/2015    Procedure: COMBINED ENDOSCOPIC ULTRASOUND, ESOPHAGOSCOPY, GASTROSCOPY, DUODENOSCOPY (EGD), FINE NEEDLE ASPIRATE/BIOPSY;  Surgeon: Bambi Salgado MD;  Location:  GI    [facility]    Procedure: COMBINED ENDOSCOPIC ULTRASOUND, ESOPHAGOSCOPY, GASTROSCOPY, DUODENOSCOPY (EGD);  Surgeon: Bambi Salgado MD;  Location:  OR    HEAD & NECK SURGERY      dental surg    ORTHOPEDIC SURGERY      nerve repair right hand, meniscus  repair-right       ALLERGIES:     Allergies   Allergen Reactions    Nuts [Peanut-Containing Drug Products] Anaphylaxis       PERTINENT MEDICATIONS:    Current Outpatient Medications:     atorvastatin (LIPITOR) 80 MG tablet, Take 1 tablet by mouth daily, Disp: , Rfl:     calcitRIOL (ROCALTROL) 0.25 MCG capsule, Take 0.25 mcg by mouth, Disp: , Rfl:     fluticasone (FLONASE) 50 MCG/ACT nasal spray, Spray 2 sprays into both nostrils daily, Disp: , Rfl:     losartan (COZAAR) 25 MG tablet, Take 0.5 tablets by mouth daily, Disp: , Rfl:     mesalamine (CANASA) 1000 MG suppository, UNWRAP AND INSERT 1 SUPPOSITORY RECTALLY EVERY EVENING AS NEEDED (Patient not taking: Reported on 11/9/2023), Disp: , Rfl:     metoprolol succinate ER (TOPROL XL) 25 MG 24 hr tablet, Take 2 tablets by mouth daily, Disp: , Rfl:     STELARA 90 MG/ML, , Disp: , Rfl:     SOCIAL HISTORY:  Social History     Socioeconomic History    Marital status:      Spouse name: Not on file    Number of children: Not on file    Years of education: Not on file    Highest education level: Not on file   Occupational History    Not on file   Tobacco Use    Smoking status: Former     Types: Cigars    Smokeless tobacco: Never    Tobacco comments:     has one cigar every 3 mos   Substance and Sexual Activity    Alcohol use: Yes     Comment: 3-6 drinks per week    Drug use: No     Comment: THC gummies - sleep    Sexual activity: Not on file   Other Topics Concern    Not on file   Social History Narrative    Not on file     Social Determinants of Health     Financial Resource Strain: Not on file   Food Insecurity: Not on file   Transportation Needs: Not on file   Physical Activity: Not on file   Stress: Not on file   Social Connections: Not on file   Interpersonal Safety: Not on file   Housing Stability: Not on file       FAMILY HISTORY:  Family History   Problem Relation Age of Onset    Atrial fibrillation Mother     Atrial fibrillation Father     CABG Maternal  Grandfather 70    Kidney Disease No family hx of     Cancer No family hx of        Past/family/social history reviewed and no changes

## 2024-09-16 NOTE — PATIENT INSTRUCTIONS
It was a pleasure meeting with you today and discussing your healthcare plan. Below is a summary of what we covered:    -- Taper off budesonide: Decrease by 1 pill every 5 days until off  -- Start canasa nightly. Rx sent to [facility]  -- OK to use bentyl as needed. Rx sent to [facility]  -- Flex sig in ~2 months.  -- Continue Stelara q4 weeks.     Follow-up in 3-4 months.     Please see below for any additional questions and scheduling guidelines.    Sign up for [employer]: [employer] patient portal serves as a secure platform for accessing your medical records from the [facility]. Additionally, [employer]Score facilitates easy, timely, and secure messaging with your care team. If you have not signed up, you may do so by using the provided code or calling [phone].    Coordinating your care after your visit:  There are multiple options for scheduling your follow-up care based on your provider's recommendation.    How do I schedule a follow-up clinic appointment:   After your appointment, you may receive scheduling assistance with the Clinic Coordinators by having a seat in the waiting room and a Clinic Coordinator will call you up to schedule.  Virtual visits or after you leave the clinic:  Your provider has placed a follow-up order in the [employer] portal for scheduling your return appointment. A member of the scheduling team will contact you to schedule.  [employer] Scheduling: Timely scheduling through [employer] is advised to ensure appointment availability.   Call to schedule: You may schedule your follow-up appointment(s) by calling [phone], option 1.    How do I schedule my endoscopy or colonoscopy procedure:  If a procedure, such as a colonoscopy or upper endoscopy was ordered by your provider, the scheduling team will contact you to schedule this procedure. Or you may choose to call to schedule at   [phone], option 2.  Please allow 20-30 minutes when scheduling a procedure.    How do I get my blood work  done? To get your blood work done, you need to schedule a lab appointment at an [facility]. There are multiple ways to schedule:   At the clinic: The Clinic Coordinator you meet after your visit can help you schedule a lab appointment.   tweetTV scheduling: tweetTV offers online lab scheduling at all [facility] laboratory locations.   Call to schedule: You can call [phone] to schedule your lab appointment.    How do I schedule my imaging study: To schedule imaging studies, such as CT scans, ultrasounds, MRIs, or X-rays, contact Imaging Services at [phone].    How do I schedule a referral to another doctor: If your provider recommended a referral to another specialist(s), the referral order was placed by your provider. You will receive a phone call to schedule this referral, or you may choose to call the number attached to the referral to self-schedule.    For Post-Visit Question(s):  For any inquiries following today's visit:  Please utilize tweetTV messaging and allow 48 hours for reply or contact the Call Center during normal business hours at [phone], option 3.  For Emergent After-hours questions, contact the On-Call GI Fellow through the [facility] at [phone].  In addition, you may contact your Nurse directly using the provided contact information.    Test Results:  Test results will be accessible via tweetTV in compliance with the 21st Century Cures Act. This means that your results will be available to you at the same time as your provider. Often you may see your results before your provider does. Results are reviewed by staff within two weeks with communication follow-up. Results may be released in the patient portal prior to your care team review.    Prescription Refill(s):  Medication prescribed by your provider will be addressed during your visit. For future refills, please coordinate with your pharmacy. If you have not had a recent clinic  visit or routine labs, for your safety, your provider may not be able to refill your prescription.

## 2024-09-16 NOTE — LETTER
9/16/2024      Titus Fowler  [address] [address]      Dear Colleague,    Thank you for referring your patient, Titus Fowler, to the [facility] [place]. Please see a copy of my visit note below.    IBD CLINIC VISIT     CC/REFERRING MD:  Zeke Vick  REASON FOR CONSULTATION: Ulcerative colitis    ASSESSMENT/PLAN  63 year old male with ulcerative colitis.     1. Ulcerative colitis:   Current medications:     - Stelara, June 2023, dose escalated to q4 in summer 2024    - Canasa PRN    - Budesonide 9mg per day  Current clinical disease activity: pMayo score 2/9 (mild disease)  Last endoscopic disease activity: eMayo 3 to 18cm    Patient with recent moderate proctitis now with a clinical response to dose escalation of Ustekinumab to every 4 weeks.  He remains on budesonide.  Plan at this point is to taper off steroids, start routine rectal therapy with Canasa and reevaluate endoscopic response to therapy in 2 months.    Should he have ongoing inflammation with Canasa, recommend transition to tacrolimus NJ as long as he has continued isolated proctitis.    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  While there is some slight optimization we are working on, he is overall not at a high risk for postoperative inflammatory bowel disease flare.    -- Continue ustekinumab 90mg q4 weeks. This can be continued in the post transplant setting.   -- Taper off budesonide  -- Start canasa NJ daily.   -- Flex sig in 2 months.     2. IBD dysplasia surveillance:   Given > 1/3 of colon involved he should have routine IBD dysplasia surveillance.  This is up to date as of the summer 2024.  Plan on 1 to 3 years based on symptoms, and transplant status.    Return to clinic in 4-6 months    This note was created with voice recognition software, and while reviewed for accuracy, typos may remain.     Jaron  LVM for pt, order sent to [facility]: [facility] PH#: [phone] // FAX#: [phone]    MD Felipe MS  Associate Professor of Medicine  Inflammatory Bowel Disease Program   Division of Gastroenterology, Hepatology and Nutrition  [facility]  Pager: [phone]      IBD HISTORY  Age at diagnosis: 40  Extent of endoscopic disease: Left sided   Extent of histologic disease:   Prior UC surgeries: None  Prior IBD Medications:   - Rowasa / Canasa   - Infliximab - dose escalated to q6 weeks. Transitioned to Stelara    DRUG MONITORING  TPMT enzyme activity:     6-TGN/6-MMPN levels:    Biologic concentration:    HPI:   UC history - symptoms started in 40s. From 40s to 50s he was just on Asacol and oral medications. But around  he had a flare requiring hospitalization and steroids.     He also had pancreatitis. He recalls there was a cyst in the pancreas. Follow-up EUS without evidence of cyst. Likely pseudocyst or other fluid collection.     THen started on Remicade, which was very benficial for years. THen incovid time, was having rectal bleeding and had a hemorrhoidectomy.     In , he stopped responding to remicade.     In 2023 changed to Stelara. After 10 months didn't really have relief. Had level testing and is now on q4 week stelara dosing. On this regimen, he thinks that things are better, more formed stool. Still has ongoing bleeding.     In past year, he has had two courses of prednisone.     He things colonoscopy was normal [id number].     10/2021: emayo 3 for 10cm  2022: email 3 for 10 cm    Getting a live donor - after son donates, 3-6 months he could get a donor.     Currently, about 1-2 months ago was having blood in every stool, lots of urgency. Did use steroid enema. Was having 3-4 stools per day.     Currently having 2-3 stools per day, typically in AM. Stools are formed. Recently, having very little blood.     Noriega score:   Stool freq: 0 (baseline stools frequency)  Rectal bleedin (Visible blood less than half of the time)  PGA: 1 (Mild)  Endoscopy: Not done on  q4 week stelara.     Extra intestinal manifestations of IBD:  No uveitis/episcleritis  No aphthous ulcers   No arthritis   No erythema nodosum/pyoderma gangrenosum.     sIBDQ:  IBDQ Score Date IBDQ - Total Score  (Higher score better)   9/12/2024   2:21 PM 38        ROS:    Constitutional, HEENT, cardiovascular, pulmonary, GI, , musculoskeletal, neuro, skin, endocrine and psych systems are negative, except as otherwise noted.    PHYSICAL EXAMINATION:  Constitutional: aaox3, cooperative, pleasant, not dyspneic/diaphoretic, no acute distress  Vitals reviewed: /86 (BP Location: Left arm, Patient Position: Sitting, Cuff Size: Adult Regular)   Pulse 84   Resp 20   Wt 88 kg (194 lb)   SpO2 97%   BMI 28.24 kg/m    Wt:   Wt Readings from Last 2 Encounters:   09/16/24 88 kg (194 lb)   09/05/24 88 kg (194 lb)      Constitutional - general appearance is well and in no acute distress. Body habitus normal  Eyes - No redness or discharge  Respiratory - No cough, unlabored breathing  Musculoskeletal - range of motion intact: Neck and arms  Skin - No discoloration or lesions  Neurological - No tremors, headaches  Psychiatric - No anxiety, alert & oriented     PERTINENT PAST MEDICAL HISTORY:  Past Medical History:   Diagnosis Date     BPH (benign prostatic hyperplasia)      Chronic kidney disease, stage 4, severely decreased GFR (H) 11/05/2023     Hypertension      MGUS (monoclonal gammopathy of unknown significance)      Noninfectious ileitis     ulcerative colitis      Sleep apnea     c pap     Ulcerative colitis (H)        PREVIOUS SURGERIES:  Past Surgical History:   Procedure Laterality Date     ESOPHAGOSCOPY, GASTROSCOPY, DUODENOSCOPY (EGD), COMBINED N/A 10/16/2015    Procedure: COMBINED ENDOSCOPIC ULTRASOUND, ESOPHAGOSCOPY, GASTROSCOPY, DUODENOSCOPY (EGD), FINE NEEDLE ASPIRATE/BIOPSY;  Surgeon: Bambi Salgado MD;  Location:  GI     [facility]  [facility]  [facility]    Procedure: COMBINED ENDOSCOPIC ULTRASOUND, ESOPHAGOSCOPY, GASTROSCOPY, DUODENOSCOPY (EGD);  Surgeon: Bambi Salgado MD;  Location:  OR     HEAD & NECK SURGERY      dental surg     ORTHOPEDIC SURGERY      nerve repair right hand, meniscus repair-right       ALLERGIES:     Allergies   Allergen Reactions     Nuts [Peanut-Containing Drug Products] Anaphylaxis       PERTINENT MEDICATIONS:    Current Outpatient Medications:      atorvastatin (LIPITOR) 80 MG tablet, Take 1 tablet by mouth daily, Disp: , Rfl:      calcitRIOL (ROCALTROL) 0.25 MCG capsule, Take 0.25 mcg by mouth, Disp: , Rfl:      fluticasone (FLONASE) 50 MCG/ACT nasal spray, Spray 2 sprays into both nostrils daily, Disp: , Rfl:      losartan (COZAAR) 25 MG tablet, Take 0.5 tablets by mouth daily, Disp: , Rfl:      mesalamine (CANASA) 1000 MG suppository, UNWRAP AND INSERT 1 SUPPOSITORY RECTALLY EVERY EVENING AS NEEDED (Patient not taking: Reported on 11/9/2023), Disp: , Rfl:      metoprolol succinate ER (TOPROL XL) 25 MG 24 hr tablet, Take 2 tablets by mouth daily, Disp: , Rfl:      STELARA 90 MG/ML, , Disp: , Rfl:     SOCIAL HISTORY:  Social History     Socioeconomic History     Marital status:      Spouse name: Not on file     Number of children: Not on file     Years of education: Not on file     Highest education level: Not on file   Occupational History     Not on file   Tobacco Use     Smoking status: Former     Types: Cigars     Smokeless tobacco: Never     Tobacco comments:     has one cigar every 3 mos   Substance and Sexual Activity     Alcohol use: Yes     Comment: 3-6 drinks per week     Drug use: No     Comment: THC gummies - sleep     Sexual activity: Not on file   Other Topics Concern     Not on file   Social History Narrative     Not on file     Social Determinants of Health     Financial Resource Strain: Not on file   Food Insecurity: Not on file   Transportation Needs: Not on file   Physical Activity: Not  on file   Stress: Not on file   Social Connections: Not on file   Interpersonal Safety: Not on file   Housing Stability: Not on file       FAMILY HISTORY:  Family History   Problem Relation Age of Onset     Atrial fibrillation Mother      Atrial fibrillation Father      CABG Maternal Grandfather 70     Kidney Disease No family hx of      Cancer No family hx of        Past/family/social history reviewed and no changes      Again, thank you for allowing me to participate in the care of your patient.        Sincerely,        Jaron Wang MD

## 2024-09-17 ENCOUNTER — DOCUMENTATION ONLY (OUTPATIENT)
Dept: TRANSPLANT | Facility: CLINIC | Age: 64
End: 2024-09-17
Payer: COMMERCIAL

## 2024-09-20 ENCOUNTER — LAB (OUTPATIENT)
Dept: LAB | Facility: CLINIC | Age: 64
End: 2024-09-20
Payer: COMMERCIAL

## 2024-09-20 DIAGNOSIS — Z76.82 ORGAN TRANSPLANT CANDIDATE: ICD-10-CM

## 2024-09-20 DIAGNOSIS — N18.4 CHRONIC KIDNEY DISEASE, STAGE 4, SEVERELY DECREASED GFR (H): ICD-10-CM

## 2024-09-20 DIAGNOSIS — D47.2 MGUS (MONOCLONAL GAMMOPATHY OF UNKNOWN SIGNIFICANCE): ICD-10-CM

## 2024-09-20 DIAGNOSIS — N18.6 ESRD (END STAGE RENAL DISEASE) (H): ICD-10-CM

## 2024-09-20 DIAGNOSIS — K51.011 ULCERATIVE PANCOLITIS WITH RECTAL BLEEDING (H): ICD-10-CM

## 2024-09-20 DIAGNOSIS — E55.9 VITAMIN D DEFICIENCY: ICD-10-CM

## 2024-09-20 DIAGNOSIS — D64.9 ANEMIA: ICD-10-CM

## 2024-09-20 LAB
ALBUMIN MFR UR ELPH: 17.2 MG/DL
ALBUMIN SERPL BCG-MCNC: 3.9 G/DL (ref 3.5–5.2)
ALBUMIN UR-MCNC: ABNORMAL MG/DL
ANION GAP SERPL CALCULATED.3IONS-SCNC: 14 MMOL/L (ref 7–15)
APPEARANCE UR: CLEAR
BILIRUB UR QL STRIP: NEGATIVE
BUN SERPL-MCNC: 47.7 MG/DL (ref 8–23)
CALCIUM SERPL-MCNC: 9.4 MG/DL (ref 8.8–10.4)
CHLORIDE SERPL-SCNC: 105 MMOL/L (ref 98–107)
COLOR UR AUTO: YELLOW
CREAT SERPL-MCNC: 3.66 MG/DL (ref 0.67–1.17)
CREAT UR-MCNC: 94.2 MG/DL
CREAT UR-MCNC: 94.2 MG/DL
EGFRCR SERPLBLD CKD-EPI 2021: 18 ML/MIN/1.73M2
ERYTHROCYTE [DISTWIDTH] IN BLOOD BY AUTOMATED COUNT: 11.7 % (ref 10–15)
FERRITIN SERPL-MCNC: 46 NG/ML (ref 31–409)
GLUCOSE SERPL-MCNC: 101 MG/DL (ref 70–99)
GLUCOSE UR STRIP-MCNC: NEGATIVE MG/DL
HCO3 SERPL-SCNC: 23 MMOL/L (ref 22–29)
HCT VFR BLD AUTO: 37.8 % (ref 40–53)
HGB BLD-MCNC: 12.7 G/DL (ref 13.3–17.7)
HGB UR QL STRIP: NEGATIVE
IRON BINDING CAPACITY (ROCHE): 273 UG/DL (ref 240–430)
IRON SATN MFR SERPL: 45 % (ref 15–46)
IRON SERPL-MCNC: 122 UG/DL (ref 61–157)
KETONES UR STRIP-MCNC: NEGATIVE MG/DL
LEUKOCYTE ESTERASE UR QL STRIP: NEGATIVE
MCH RBC QN AUTO: 33.8 PG (ref 26.5–33)
MCHC RBC AUTO-ENTMCNC: 33.6 G/DL (ref 31.5–36.5)
MCV RBC AUTO: 101 FL (ref 78–100)
MICROALBUMIN UR-MCNC: 12.4 MG/L
MICROALBUMIN/CREAT UR: 13.16 MG/G CR (ref 0–17)
NITRATE UR QL: NEGATIVE
PH UR STRIP: 6 [PH] (ref 5–7)
PHOSPHATE SERPL-MCNC: 2.8 MG/DL (ref 2.5–4.5)
PLATELET # BLD AUTO: 276 10E3/UL (ref 150–450)
POTASSIUM SERPL-SCNC: 3.7 MMOL/L (ref 3.4–5.3)
PROT/CREAT 24H UR: 0.18 MG/MG CR (ref 0–0.2)
PTH-INTACT SERPL-MCNC: 10 PG/ML (ref 15–65)
RBC # BLD AUTO: 3.76 10E6/UL (ref 4.4–5.9)
RBC #/AREA URNS AUTO: NORMAL /HPF
SODIUM SERPL-SCNC: 142 MMOL/L (ref 135–145)
SP GR UR STRIP: 1.01 (ref 1–1.03)
UROBILINOGEN UR STRIP-ACNC: 0.2 E.U./DL
VIT D+METAB SERPL-MCNC: 66 NG/ML (ref 20–50)
WBC # BLD AUTO: 8.9 10E3/UL (ref 4–11)
WBC #/AREA URNS AUTO: NORMAL /HPF

## 2024-09-20 PROCEDURE — 82570 ASSAY OF URINE CREATININE: CPT

## 2024-09-20 PROCEDURE — 84156 ASSAY OF PROTEIN URINE: CPT

## 2024-09-20 PROCEDURE — 83970 ASSAY OF PARATHORMONE: CPT

## 2024-09-20 PROCEDURE — 83550 IRON BINDING TEST: CPT

## 2024-09-20 PROCEDURE — 86833 HLA CLASS II HIGH DEFIN QUAL: CPT

## 2024-09-20 PROCEDURE — 86832 HLA CLASS I HIGH DEFIN QUAL: CPT

## 2024-09-20 PROCEDURE — 81001 URINALYSIS AUTO W/SCOPE: CPT

## 2024-09-20 PROCEDURE — 82728 ASSAY OF FERRITIN: CPT

## 2024-09-20 PROCEDURE — 82043 UR ALBUMIN QUANTITATIVE: CPT

## 2024-09-20 PROCEDURE — 85027 COMPLETE CBC AUTOMATED: CPT

## 2024-09-20 PROCEDURE — 36415 COLL VENOUS BLD VENIPUNCTURE: CPT

## 2024-09-20 PROCEDURE — 82306 VITAMIN D 25 HYDROXY: CPT

## 2024-09-20 PROCEDURE — 80069 RENAL FUNCTION PANEL: CPT

## 2024-09-20 PROCEDURE — 83540 ASSAY OF IRON: CPT

## 2024-09-20 RX ORDER — DICYCLOMINE HYDROCHLORIDE 10 MG/1
20 CAPSULE ORAL
Qty: 480 CAPSULE | Refills: 0 | Status: CANCELLED | OUTPATIENT
Start: 2024-09-20

## 2024-09-20 NOTE — TELEPHONE ENCOUNTER
90 Day prescription request.    Requested Prescriptions   Pending Prescriptions Disp Refills    dicyclomine (BENTYL) 10 MG capsule 480 capsule 0     Sig: Take 2 capsules (20 mg) by mouth 4 times daily (before meals and nightly).       There is no refill protocol information for this order        Last Written Prescription Date:  09/16/2024  Last Fill Quantity: 480 capsule,  # refills: 0   Last office visit: 9/16/2024 ; last virtual visit: Visit date not found with prescribing provider:  Jaron Wang MD    Future Office Visit:  01/13/2025

## 2024-09-21 ENCOUNTER — MYC MEDICAL ADVICE (OUTPATIENT)
Dept: GASTROENTEROLOGY | Facility: CLINIC | Age: 64
End: 2024-09-21
Payer: COMMERCIAL

## 2024-09-21 DIAGNOSIS — K51.011 ULCERATIVE PANCOLITIS WITH RECTAL BLEEDING (H): Primary | ICD-10-CM

## 2024-09-24 ENCOUNTER — OFFICE VISIT (OUTPATIENT)
Dept: NEPHROLOGY | Facility: CLINIC | Age: 64
End: 2024-09-24
Payer: COMMERCIAL

## 2024-09-24 VITALS
BODY MASS INDEX: 28.38 KG/M2 | DIASTOLIC BLOOD PRESSURE: 77 MMHG | WEIGHT: 195 LBS | SYSTOLIC BLOOD PRESSURE: 109 MMHG | RESPIRATION RATE: 18 BRPM | OXYGEN SATURATION: 98 % | HEART RATE: 98 BPM

## 2024-09-24 DIAGNOSIS — T14.8XXA BRUISING: Primary | ICD-10-CM

## 2024-09-24 DIAGNOSIS — N18.4 CKD (CHRONIC KIDNEY DISEASE) STAGE 4, GFR 15-29 ML/MIN (H): ICD-10-CM

## 2024-09-24 PROCEDURE — 99204 OFFICE O/P NEW MOD 45 MIN: CPT | Performed by: INTERNAL MEDICINE

## 2024-09-24 RX ORDER — HYDROCORTISONE ACETATE 25 MG
SUPPOSITORY, RECTAL RECTAL
COMMUNITY
Start: 2024-05-17

## 2024-09-24 RX ORDER — LOSARTAN POTASSIUM 25 MG/1
25 TABLET ORAL DAILY
Qty: 90 TABLET | Refills: 3 | Status: SHIPPED | OUTPATIENT
Start: 2024-09-24

## 2024-09-24 RX ORDER — ATORVASTATIN CALCIUM 80 MG/1
80 TABLET, FILM COATED ORAL DAILY
Qty: 90 TABLET | Refills: 3 | Status: SHIPPED | OUTPATIENT
Start: 2024-09-24

## 2024-09-24 RX ORDER — HYDROCORTISONE 100 MG/60ML
SUSPENSION RECTAL
COMMUNITY

## 2024-09-24 RX ORDER — BUDESONIDE 3 MG/1
CAPSULE, COATED PELLETS ORAL EVERY 24 HOURS
COMMUNITY
Start: 2024-04-12

## 2024-09-24 RX ORDER — METOPROLOL SUCCINATE 50 MG/1
50 TABLET, EXTENDED RELEASE ORAL DAILY
Qty: 90 TABLET | Refills: 3 | Status: SHIPPED | OUTPATIENT
Start: 2024-09-24

## 2024-09-24 RX ORDER — BUDESONIDE 28 MG/1
2 AEROSOL, FOAM RECTAL DAILY
Qty: 66.8 G | Refills: 0 | Status: SHIPPED | OUTPATIENT
Start: 2024-09-24 | End: 2024-10-08

## 2024-09-24 NOTE — PATIENT INSTRUCTIONS
It was a pleasure taking care of you today.  I've included a brief summary of our discussion and care plan from today's visit below.      My recommendations are summarized as follows:  - Stop calcitriol  - Labs as discussed today.  - Uremic symptoms are signs that your kidneys are not working properly. They can include feeling tired or weak, loss of appetite, nausea or vomiting, trouble sleeping, changes in your mental state like confusion or difficulty concentrating, shortness of breath, and swelling in your hands or feet. These symptoms can occur when waste products build up in your blood because your kidneys can't remove them. If you experience any of these signs, it might mean that you are getting closer to needing dialysis. Call my office if you experience any such symptoms.  - Return to Nephrology Clinic in 3-6 months to review your progress.     Who do I call with any questions after my visit?  Please be in touch if there are any further questions that arise following today's visit.  There are multiple ways to contact your nephrology care team.      During business hours, you may reach your Nephrology RN Care Coordinator, Belinda Shelby at [phone]. For urgent/emergent questions after business hours, you may reach the on-call Nephrology Fellow by contacting the [facility] at [phone]. You can always send a secure message through [employer]Arch[employer].  MEDArch[employer] messages are answered by your nurse or doctor typically within 24 hours.  Please allow extra time on weekends and holidays.      How do I schedule appointments?  To schedule or reschedule an appointment, please call [phone]. To schedule imaging please call [phone]. To schedule your lab appointment at [facility] 1st floor lab call [phone].    How will I get the results of any tests ordered?    You will receive all of your results.  If you have signed up for [employer], any tests ordered at your visit will be available  to you after your physician reviews them.  Typically this takes 1-2 weeks.  If there are urgent results that require a change in your care plan, your physician or nurse will call you to discuss the next steps.      What is [employer]?  IronPlanet is a secure way for you to access all of your healthcare records from the [facility].  It is a web based computer program, so you can sign on to it from any location.  It also allows you to send secure messages to your care team.  I recommend signing up for IronPlanet access if you have not already done so and are comfortable with using a computer.      Sincerely,    Gabriel Valverde MD  [facility]  Division of Nephrology and Hypertension

## 2024-09-24 NOTE — PROGRESS NOTES
Nephrology Outpatient Consult Note (09/24/2024)     Requesting Provider  Agustín Taveras MD    Chief Complaint/Reason for Visit  Chronic kidney disease    History of Present Illness  This is a 63 year old male who presents to the clinic for evaluation of  chronic kidney disease. He has a medical history that includes essential hypertension, hyperlipidemia, ulcerative colitis, and benign prostatic hyperplasia for which he underwent a laser TURP procedure in 2018.    He has progressive renal failure, and his baseline creatinine levels have ranged from 1.9 to 2.1 in 2018 up to 3.6 mg in 2024. He was followed by an outside nephrologist in the past, and it was felt that the CKD was likely due to glomerular nephrosclerosis resulting from long-standing hypertension and bladder outlet obstruction. Of note, he is connected with a kidney transplant clinic.  His most recent visit was in 2023 and at that point he was felt to be a good candidate.    His most recent laboratory evaluation was earlier this month.  His serum creatinine is 3.6 mg/dL with an estimated GFR of 18 mL/min.  Serum sodium, potassium, chloride, bicarbonate were normal.  Serum calcium and phosphorus were normal.  Serum albumin is normal.  Serum glucose was 101.  Iron saturation 45%.  Vitamin D level 66.  Hemoglobin 12.6.  Urinalysis is negative for proteinuria, hematuria, or pyuria.  Serum PTH is low at 10.  He had a CT scan of his abdomen in 2023.  The kidneys were negative for obstructing stones, or hydronephrosis.    The patient is accompanied by his wife.  Overall, he feels well.  He indicates that he is transitioning his care to our group given his new insurance with [employer].  Previously he had his care through [facility].  No acute symptoms today.  He continues to exercise on a regular basis and enjoys playing tennis.  He denies shortness of breath, chest pain.  No abdominal pain.  No dysuria, gross hematuria.  No difficulties voiding.  No  lower extremity edema.    The following portions of the patient's history were reviewed and updated as appropriate: allergies, current medications, past family history, past medical history, past social history, past surgical history, and problem list.    Subjective   Review of Systems  A comprehensive review of systems was performed. Pertinent positives and negatives are described above.    Past Medical/Surgical History  Patient  has a past medical history of BPH (benign prostatic hyperplasia), Chronic kidney disease, stage 4, severely decreased GFR (H) (11/05/2023), Hypertension, MGUS (monoclonal gammopathy of unknown significance), Noninfectious ileitis, Sleep apnea, and Ulcerative colitis (H).    He has no past medical history of Chronic infection, Malignant hyperthermia, Other chronic pain, or Thrombosis of leg.  Patient  has a past surgical history that includes Head and neck surgery; orthopedic surgery; UGI ENDOSCOPY W EUS (N/A, 10/02/2014); Esophagoscopy, gastroscopy, duodenoscopy (EGD), combined (N/A, 10/16/2015); and Abdomen surgery (2018).    Social History  Patient  reports that he has quit smoking. His smoking use included cigars. He has never used smokeless tobacco. He reports current alcohol use. He reports that he does not use drugs.    Family History  family history includes Atrial fibrillation in his father and mother; CABG (age of onset: 70) in his maternal grandfather.     Physical Examination  Blood pressure 109/77, pulse 98, resp. rate 18, weight 88.5 kg (195 lb), SpO2 98%. Body mass index is 28.38 kg/m .  General:  Well appearing, well nourished in no distress.  Oriented x 3, normal mood and affect.  Head:  normocephalic, atraumatic    Eyes: conjunctiva clear, EOM intact, PERRL.   Throat:  No erythema, exudates or lesions.   Neck:  neck supple, no masses  Heart:  RRR, no murmur   Lungs:  CTA bilaterally, no wheezes, rhonchi, rales.  Breathing unlabored.   Abdomen:  Soft, NT/ND, no HSM, no  masses.   Extremities: No deformities, clubbing, cyanosis, or edema.   Neurologic: A/O x 3. No focal deficits.     Objective   Pertinent Laboratory and Imaging Results  Most Recent Serum Chemistries  Lab Results   Component Value Date    WBC 8.9 09/20/2024    HGB 12.7 (L) 09/20/2024    HCT 37.8 (L) 09/20/2024     09/20/2024     09/20/2024    POTASSIUM 3.7 09/20/2024    CHLORIDE 105 09/20/2024    CO2 23 09/20/2024    BUN 47.7 (H) 09/20/2024    CR 3.66 (H) 09/20/2024     (H) 09/20/2024    SED 44 (H) 10/07/2010    AST 27 10/30/2023    ALT 28 10/30/2023    ALKPHOS 72 10/30/2023    INR 1.04 10/30/2023     Most Recent Urinalysis  Lab Results   Component Value Date    COLOR Yellow 09/20/2024    APPEARANCE Clear 09/20/2024    URINEGLC Negative 09/20/2024    URINEBILI Negative 09/20/2024    URINEKETONE Negative 09/20/2024    SG 1.015 09/20/2024    URINEPH 6.0 09/20/2024    PROTEIN Trace (A) 09/20/2024    UROBILINOGEN 0.2 09/20/2024    NITRITE Negative 09/20/2024    LEUKEST Negative 09/20/2024     Current Outpatient Medications   Medication Sig Dispense Refill    ANUCORT-HC 25 MG suppository UNWRAP AND INSERT 1 SUPPOSITORY RECTALLY EVERY DAY AS NEEDED      atorvastatin (LIPITOR) 80 MG tablet Take 1 tablet (80 mg) by mouth daily. 90 tablet 3    budesonide (ENTOCORT EC) 3 MG EC capsule Take by mouth every 24 hours.      losartan (COZAAR) 25 MG tablet Take 1 tablet (25 mg) by mouth daily. 90 tablet 3    metoprolol succinate ER (TOPROL XL) 50 MG 24 hr tablet Take 1 tablet (50 mg) by mouth daily. 90 tablet 3    Budesonide 2 MG/ACT FOAM Place 2 mg rectally daily for 14 days. 66.8 g 0    dicyclomine (BENTYL) 10 MG capsule Take 2 capsules (20 mg) by mouth 4 times daily (before meals and nightly). 480 capsule 0    fluticasone (FLONASE) 50 MCG/ACT nasal spray Spray 2 sprays into both nostrils daily      hydrocortisone (CORTENEMA) 100 MG/60ML enema INSERT 60ML RECTALLY EVERY DAY AS NEEDED FOR COLITIS AS NEEDED       mesalamine (CANASA) 1000 MG suppository Place 1 suppository (1,000 mg) rectally at bedtime. 90 suppository 3    STELARA 90 MG/ML        No current facility-administered medications for this visit.        Assessment & Plan   Patient Active Problem List   Diagnosis    Obstructive sleep apnea    MGUS (monoclonal gammopathy of unknown significance)    Chronic kidney disease, stage 4, severely decreased GFR (H)    BPH (benign prostatic hyperplasia)    Ulcerative colitis (H)    Hypertension     The patient has chronic kidney disease currently stage IV likely due to bladder outlet obstruction, and hypertension.  His kidney function has overall been stable over the course of the last year.  His urinalysis shows a bland sediment arguing against parenchymal kidney disease.  He does not have any uremic symptoms.    With regards to workup, I think it is reasonable to continue checking serum monoclonal protein studies on a yearly basis.  If his MGUS worsens, then it would be reasonable to refer him to hematology.  However, I note that he had a hematological evaluation in the past including a bone marrow biopsy that was reassuring.    With regards to management, the patient blood pressure is fairly well-controlled.  I instructed him to continue his blood pressure medications.  His PTH is low, thus, I instructed him to stop his calcitriol.  I instructed him to take no more than 1000 units of vitamin D daily.  I counseled him with regards to the importance of healthy lifestyle.  I educated him regarding uremic symptoms and I instructed him to call my office in case he experiences them.    Ultimately, the patient is hoping to get a preemptive kidney transplant and has a couple  donors being evaluated.      My recommendations are the following:  - Stop calcitriol  - Labs as discussed today.  - Uremic symptoms are signs that your kidneys are not working properly. They can include feeling tired or weak, loss of appetite, nausea or  vomiting, trouble sleeping, changes in your mental state like confusion or difficulty concentrating, shortness of breath, and swelling in your hands or feet. These symptoms can occur when waste products build up in your blood because your kidneys can't remove them. If you experience any of these signs, it might mean that you are getting closer to needing dialysis. Call my office if you experience any such symptoms.  - Return to Nephrology Clinic in 3-6 months to review your progress.     Total time was of this encounter on the date of service was 50 minutes. All questions were answered to the patient's satisfaction.  Chart documentation was completed, in part, with kaleo voice-recognition software. Even though reviewed, some grammatical, spelling, and word errors may remain.    Orders placed today:  Orders Placed This Encounter   Procedures    INR    Protein Immunofixation Serum    Kappa and lambda light chain    Prostate Specific Antigen Screen    Lipid panel reflex to direct LDL Fasting     Gabriel Valverde MD  Division of Nephrology and Hypertension  [employer]   Vocera Web Console ([employer])

## 2024-10-03 ENCOUNTER — LAB (OUTPATIENT)
Dept: LAB | Facility: CLINIC | Age: 64
End: 2024-10-03
Payer: COMMERCIAL

## 2024-10-03 DIAGNOSIS — N18.4 CKD (CHRONIC KIDNEY DISEASE) STAGE 4, GFR 15-29 ML/MIN (H): ICD-10-CM

## 2024-10-03 DIAGNOSIS — T14.8XXA BRUISING: ICD-10-CM

## 2024-10-03 LAB
CHOLEST SERPL-MCNC: 136 MG/DL
FASTING STATUS PATIENT QL REPORTED: YES
HDLC SERPL-MCNC: 36 MG/DL
INR PPP: 0.93 (ref 0.85–1.15)
LDLC SERPL CALC-MCNC: 77 MG/DL
NONHDLC SERPL-MCNC: 100 MG/DL
PSA SERPL DL<=0.01 NG/ML-MCNC: 0.77 NG/ML (ref 0–4.5)
TOTAL PROTEIN SERUM FOR ELP: 6.9 G/DL (ref 6.4–8.3)
TRIGL SERPL-MCNC: 115 MG/DL

## 2024-10-03 PROCEDURE — 36415 COLL VENOUS BLD VENIPUNCTURE: CPT

## 2024-10-03 PROCEDURE — G0103 PSA SCREENING: HCPCS

## 2024-10-03 PROCEDURE — 85610 PROTHROMBIN TIME: CPT

## 2024-10-03 PROCEDURE — 80061 LIPID PANEL: CPT

## 2024-10-03 PROCEDURE — 84165 PROTEIN E-PHORESIS SERUM: CPT | Performed by: PATHOLOGY

## 2024-10-03 PROCEDURE — 86334 IMMUNOFIX E-PHORESIS SERUM: CPT | Performed by: PATHOLOGY

## 2024-10-03 PROCEDURE — 84155 ASSAY OF PROTEIN SERUM: CPT

## 2024-10-03 PROCEDURE — 83521 IG LIGHT CHAINS FREE EACH: CPT

## 2024-10-04 LAB
ALBUMIN SERPL ELPH-MCNC: 3.7 G/DL (ref 3.7–5.1)
ALPHA1 GLOB SERPL ELPH-MCNC: 0.4 G/DL (ref 0.2–0.4)
ALPHA2 GLOB SERPL ELPH-MCNC: 0.7 G/DL (ref 0.5–0.9)
B-GLOBULIN SERPL ELPH-MCNC: 0.9 G/DL (ref 0.6–1)
GAMMA GLOB SERPL ELPH-MCNC: 1.2 G/DL (ref 0.7–1.6)
KAPPA LC FREE SER-MCNC: 11.01 MG/DL (ref 0.33–1.94)
KAPPA LC FREE/LAMBDA FREE SER NEPH: 1 {RATIO} (ref 0.26–1.65)
LAMBDA LC FREE SERPL-MCNC: 10.97 MG/DL (ref 0.57–2.63)
LOCATION OF TASK: ABNORMAL
LOCATION OF TASK: NORMAL
M PROTEIN SERPL ELPH-MCNC: 0.1 G/DL
PROT PATTERN SERPL ELPH-IMP: ABNORMAL
PROT PATTERN SERPL IFE-IMP: NORMAL

## 2024-10-25 ENCOUNTER — PATIENT OUTREACH (OUTPATIENT)
Dept: CARE COORDINATION | Facility: CLINIC | Age: 64
End: 2024-10-25
Payer: COMMERCIAL

## 2024-10-25 NOTE — PROGRESS NOTES
Clinical Product Navigator RN reviewed chart; patient on payer product coverage.  Review results:   CPN Initial Information Gathering  Referral Source: Health Plan    Secure update received from [facility]  Vanessa Edouard ([phone]):      Owen has been listed for transplant prior to [facility]'s involvement. He transitioned to [facility] 8/1/2024.      No additional action indicated at this time.     Elaine Leal RN   Clinical Product Navigator   [email or web address]   Office: [phone]

## 2024-10-30 ENCOUNTER — HOSPITAL ENCOUNTER (OUTPATIENT)
Facility: CLINIC | Age: 64
End: 2024-10-30
Attending: INTERNAL MEDICINE | Admitting: INTERNAL MEDICINE
Payer: COMMERCIAL

## 2024-10-30 ENCOUNTER — TELEPHONE (OUTPATIENT)
Dept: GASTROENTEROLOGY | Facility: CLINIC | Age: 64
End: 2024-10-30
Payer: COMMERCIAL

## 2024-10-30 NOTE — TELEPHONE ENCOUNTER
"Endoscopy Scheduling Screen    Have you had any respiratory illness or flu-like symptoms in the last 10 days?  No    What is your communication preference for Instructions and/or Bowel Prep?   MyChart    What insurance is in the chart?  Other:  [employer]    Ordering/Referring Provider: Jaron Wang MD   (If ordering provider performs procedure, schedule with ordering provider unless otherwise instructed. )    BMI: Estimated body mass index is 28.38 kg/m  as calculated from the following:    Height as of 10/30/23: 1.765 m (5' 9.5\").    Weight as of 9/24/24: 88.5 kg (195 lb).     Sedation Ordered  moderate sedation.   If patient BMI > 50 do not schedule in [facility].    If patient BMI > 45 do not schedule at [facility].    Are you taking methadone or Suboxone?  NO, No RN review required.    Have you been diagnosed and are being treated for severe PTSD or severe anxiety?  NO, No RN review required.    Are you taking any prescription medications for pain 3 or more times per week?   NO, No RN review required.    Do you have a history of malignant hyperthermia?  No    (Females) Are you currently pregnant?   No     Have you been diagnosed or told you have pulmonary hypertension?   No    Do you have an LVAD?  No    Have you been told you have moderate to severe sleep apnea?  Yes. Do you use a CPAP? Yes. (RN Review required for scheduling unless scheduling in Hospital.)     Have you been told you have COPD, asthma, or any other lung disease?  No    Do you have any heart conditions?  No     Have you ever had or are you waiting for an organ transplant?  Yes. Have you had or are on on the wait list for a heart/lung transplant? No, may schedule at all facilities except [facility]    Have you had a stroke or transient ischemic attack (TIA aka \"mini stroke\" in the last 6 months?   No    Have you been diagnosed with or been told you have cirrhosis of the liver?   No.    Are you currently on dialysis?   No    Do you need assistance " "transferring?   No    BMI: Estimated body mass index is 28.38 kg/m  as calculated from the following:    Height as of 10/30/23: 1.765 m (5' 9.5\").    Weight as of 9/24/24: 88.5 kg (195 lb).     Is patients BMI > 40 and scheduling location [facility]?  No    Do you take an injectable or oral medication for weight loss or diabetes (excluding insulin)?  No    Do you take the medication Naltrexone?  No    Do you take blood thinners?  No       Prep   Are you currently on dialysis or do you have chronic kidney disease?  Yes ([facility] Prep)    Do you have a diagnosis of diabetes?  No    Do you have a diagnosis of cystic fibrosis (CF)?  No    On a regular basis do you go 3 -5 days between bowel movements?  No    BMI > 40?  No    Preferred Pharmacy:    [id number] - [place] - [address]  AT [place] [id number] [place] [address]  Phone: [phone] Fax: [phone]      Final Scheduling Details     Procedure scheduled  Flexible sigmoidoscopy    Surgeon:  Jaron Wang MD     Date of procedure:  12/02/2024     Pre-OP / PAC:   No - Not required for this site.    Location  [facility] - Patient preference.    Sedation   Moderate Sedation - Per order.      Patient Reminders:   You will receive a call from a Nurse to review instructions and health history.  This assessment must be completed prior to your procedure.  Failure to complete the Nurse assessment may result in the procedure being cancelled.      On the day of your procedure, please designate an adult(s) who can drive you home stay with you for the next 24 hours. The medicines used in the exam will make you sleepy. You will not be able to drive.      You cannot take public transportation, ride share services, or non-medical taxi service without a responsible caregiver.  Medical transport services are allowed with the requirement that a responsible caregiver will receive you at your destination.  We " require that drivers and caregivers are confirmed prior to your procedure.

## 2024-11-08 ENCOUNTER — MYC MEDICAL ADVICE (OUTPATIENT)
Dept: NEPHROLOGY | Facility: CLINIC | Age: 64
End: 2024-11-08
Payer: COMMERCIAL

## 2024-11-08 DIAGNOSIS — D47.2 MGUS (MONOCLONAL GAMMOPATHY OF UNKNOWN SIGNIFICANCE): ICD-10-CM

## 2024-11-08 DIAGNOSIS — N18.4 CKD (CHRONIC KIDNEY DISEASE) STAGE 4, GFR 15-29 ML/MIN (H): Primary | ICD-10-CM

## 2024-11-11 ENCOUNTER — TELEPHONE (OUTPATIENT)
Dept: TRANSPLANT | Facility: CLINIC | Age: 64
End: 2024-11-11
Payer: COMMERCIAL

## 2024-11-11 NOTE — TELEPHONE ENCOUNTER
Pt wife called in, stating that they have finished the donor workup, encouraged them to speak to their donor coordinator. Pt verbalized understanding of information and has no further questions. Encouraged to reach out if questions arise.

## 2024-11-12 NOTE — TELEPHONE ENCOUNTER
Called patient and let him know that standing orders are now in. He verbalized understanding.   [Annual] : an annual visit.

## 2024-11-13 ENCOUNTER — LAB (OUTPATIENT)
Dept: LAB | Facility: CLINIC | Age: 64
End: 2024-11-13
Payer: COMMERCIAL

## 2024-11-13 DIAGNOSIS — D47.2 MGUS (MONOCLONAL GAMMOPATHY OF UNKNOWN SIGNIFICANCE): ICD-10-CM

## 2024-11-13 DIAGNOSIS — N18.4 CKD (CHRONIC KIDNEY DISEASE) STAGE 4, GFR 15-29 ML/MIN (H): ICD-10-CM

## 2024-11-13 LAB
ANION GAP SERPL CALCULATED.3IONS-SCNC: 10 MMOL/L (ref 7–15)
BUN SERPL-MCNC: 30.9 MG/DL (ref 8–23)
CALCIUM SERPL-MCNC: 9.2 MG/DL (ref 8.8–10.4)
CHLORIDE SERPL-SCNC: 104 MMOL/L (ref 98–107)
CREAT SERPL-MCNC: 3.98 MG/DL (ref 0.67–1.17)
EGFRCR SERPLBLD CKD-EPI 2021: 16 ML/MIN/1.73M2
GLUCOSE SERPL-MCNC: 99 MG/DL (ref 70–99)
HCO3 SERPL-SCNC: 25 MMOL/L (ref 22–29)
HGB BLD-MCNC: 12.5 G/DL (ref 13.3–17.7)
POTASSIUM SERPL-SCNC: 4.2 MMOL/L (ref 3.4–5.3)
SODIUM SERPL-SCNC: 139 MMOL/L (ref 135–145)

## 2024-11-13 PROCEDURE — 36415 COLL VENOUS BLD VENIPUNCTURE: CPT

## 2024-11-13 PROCEDURE — 80048 BASIC METABOLIC PNL TOTAL CA: CPT

## 2024-11-13 PROCEDURE — 85018 HEMOGLOBIN: CPT

## 2024-11-18 ENCOUNTER — TELEPHONE (OUTPATIENT)
Dept: GASTROENTEROLOGY | Facility: CLINIC | Age: 64
End: 2024-11-18
Payer: COMMERCIAL

## 2024-11-18 NOTE — TELEPHONE ENCOUNTER
Pre assessment completed for upcoming procedure.      Procedure details:    Patient scheduled for Flexible sigmoidoscopy on 12/2/24.     Arrival time: 1445. Procedure time 1530    Facility location: [facility]; [address] [place] [address] [place] [address]. Check in location: 1st [facility].     Sedation type: Conscious sedation     Pre op exam needed? No.    Indication for procedure: Ulcerative pancolitis with rectal bleeding    Designated  policy reviewed. Instructed to have someone stay 6 hours post procedure.       Chart review:     Electronic implanted devices? No    Recent diagnosis of diverticulitis within the last 6 weeks?  No      Medication review:    Diabetic? No    Anticoagulants? No    Weight loss medication/injectable? No.    Other medication HOLDING recommendations:  N/A      Prep for procedure:    Bowel prep recommendation: 2 tap water Enemas  Due to: CKD noted. Tap water enemas instead of Fleet d/t stage 4 CKD    Prep instructions sent via allyve     Reviewed procedure prep instructions.     Patient verbalized understanding and had no questions or concerns at this time.        Alexandrea Bullock RN  Endoscopy Procedure Pre Assessment   [phone] option 2

## 2024-11-19 SDOH — HEALTH STABILITY: PHYSICAL HEALTH: ON AVERAGE, HOW MANY MINUTES DO YOU ENGAGE IN EXERCISE AT THIS LEVEL?: 0 MIN

## 2024-11-19 SDOH — HEALTH STABILITY: PHYSICAL HEALTH: ON AVERAGE, HOW MANY DAYS PER WEEK DO YOU ENGAGE IN MODERATE TO STRENUOUS EXERCISE (LIKE A BRISK WALK)?: 0 DAYS

## 2024-11-19 ASSESSMENT — SOCIAL DETERMINANTS OF HEALTH (SDOH): HOW OFTEN DO YOU GET TOGETHER WITH FRIENDS OR RELATIVES?: TWICE A WEEK

## 2024-11-20 ENCOUNTER — OFFICE VISIT (OUTPATIENT)
Dept: FAMILY MEDICINE | Facility: CLINIC | Age: 64
End: 2024-11-20
Payer: COMMERCIAL

## 2024-11-20 VITALS
SYSTOLIC BLOOD PRESSURE: 128 MMHG | HEART RATE: 91 BPM | TEMPERATURE: 97.9 F | WEIGHT: 195.5 LBS | HEIGHT: 69 IN | DIASTOLIC BLOOD PRESSURE: 82 MMHG | BODY MASS INDEX: 28.96 KG/M2 | RESPIRATION RATE: 16 BRPM | OXYGEN SATURATION: 99 %

## 2024-11-20 DIAGNOSIS — N18.4 CHRONIC KIDNEY DISEASE, STAGE 4, SEVERELY DECREASED GFR (H): ICD-10-CM

## 2024-11-20 DIAGNOSIS — Z00.00 ROUTINE GENERAL MEDICAL EXAMINATION AT A HEALTH CARE FACILITY: Primary | ICD-10-CM

## 2024-11-20 DIAGNOSIS — N18.6 ESRD (END STAGE RENAL DISEASE) (H): ICD-10-CM

## 2024-11-20 ASSESSMENT — PAIN SCALES - GENERAL: PAINLEVEL_OUTOF10: NO PAIN (0)

## 2024-11-20 NOTE — PATIENT INSTRUCTIONS
Patient Education   Preventive Care Advice   This is general advice given by our system to help you stay healthy. However, your care team may have specific advice just for you. Please talk to your care team about your preventive care needs.  Nutrition  Eat 5 or more servings of fruits and vegetables each day.  Try wheat bread, brown rice and whole grain pasta (instead of white bread, rice, and pasta).  Get enough calcium and vitamin D. Check the label on foods and aim for 100% of the RDA (recommended daily allowance).  Lifestyle  Exercise at least 150 minutes each week  (30 minutes a day, 5 days a week).  Do muscle strengthening activities 2 days a week. These help control your weight and prevent disease.  No smoking.  Wear sunscreen to prevent skin cancer.  Have a dental exam and cleaning every 6 months.  Yearly exams  See your health care team every year to talk about:  Any changes in your health.  Any medicines your care team has prescribed.  Preventive care, family planning, and ways to prevent chronic diseases.  Shots (vaccines)   HPV shots (up to age 26), if you've never had them before.  Hepatitis B shots (up to age 59), if you've never had them before.  COVID-19 shot: Get this shot when it's due.  Flu shot: Get a flu shot every year.  Tetanus shot: Get a tetanus shot every 10 years.  Pneumococcal, hepatitis A, and RSV shots: Ask your care team if you need these based on your risk.  Shingles shot (for age 50 and up)  General health tests  Diabetes screening:  Starting at age 35, Get screened for diabetes at least every 3 years.  If you are younger than age 35, ask your care team if you should be screened for diabetes.  Cholesterol test: At age 39, start having a cholesterol test every 5 years, or more often if advised.  Bone density scan (DEXA): At age 50, ask your care team if you should have this scan for osteoporosis (brittle bones).  Hepatitis C: Get tested at least once in your life.  STIs (sexually  transmitted infections)  Before age 24: Ask your care team if you should be screened for STIs.  After age 24: Get screened for STIs if you're at risk. You are at risk for STIs (including HIV) if:  You are sexually active with more than one person.  You don't use condoms every time.  You or a partner was diagnosed with a sexually transmitted infection.  If you are at risk for HIV, ask about PrEP medicine to prevent HIV.  Get tested for HIV at least once in your life, whether you are at risk for HIV or not.  Cancer screening tests  Cervical cancer screening: If you have a cervix, begin getting regular cervical cancer screening tests starting at age 21.  Breast cancer scan (mammogram): If you've ever had breasts, begin having regular mammograms starting at age 40. This is a scan to check for breast cancer.  Colon cancer screening: It is important to start screening for colon cancer at age 45.  Have a colonoscopy test every 10 years (or more often if you're at risk) Or, ask your provider about stool tests like a FIT test every year or Cologuard test every 3 years.  To learn more about your testing options, visit:   .  For help making a decision, visit:   https://bit.ly/do86325.  Prostate cancer screening test: If you have a prostate, ask your care team if a prostate cancer screening test (PSA) at age 55 is right for you.  Lung cancer screening: If you are a current or former smoker ages 50 to 80, ask your care team if ongoing lung cancer screenings are right for you.  For informational purposes only. Not to replace the advice of your health care provider. Copyright   2023 [facility]. All rights reserved. Clinically reviewed by the [facility]. [facility] 430885 - REV 01/24.  Substance Use Disorder: Care Instructions  Overview     You can improve your life and health by stopping your use of alcohol or drugs. When you don't drink or use drugs, you may feel and sleep better. You may  get along better with your family, friends, and coworkers. There are medicines and programs that can help with substance use disorder.  How can you care for yourself at home?  Here are some ways to help you stay sober and prevent relapse.  If you have been given medicine to help keep you sober or reduce your cravings, be sure to take it exactly as prescribed.  Talk to your doctor about programs that can help you stop using drugs or drinking alcohol.  Do not keep alcohol or drugs in your home.  Plan ahead. Think about what you'll say if other people ask you to drink or use drugs. Try not to spend time with people who drink or use drugs.  Use the time and money spent on drinking or drugs to do something that's important to you.  Preventing a relapse  Have a plan to deal with relapse. Learn to recognize changes in your thinking that lead you to drink or use drugs. Get help before you start to drink or use drugs again.  Try to stay away from situations, friends, or places that may lead you to drink or use drugs.  If you feel the need to drink alcohol or use drugs again, seek help right away. Call a trusted friend or family member. Some people get support from organizations such as Narcotics Anonymous or [employer] or from treatment facilities.  If you relapse, get help as soon as you can. Some people make a plan with another person that outlines what they want that person to do for them if they relapse. The plan usually includes how to handle the relapse and who to notify in case of relapse.  Don't give up. Remember that a relapse doesn't mean that you have failed. Use the experience to learn the triggers that lead you to drink or use drugs. Then quit again. Recovery is a lifelong process. Many people have several relapses before they are able to quit for good.  Follow-up care is a key part of your treatment and safety. Be sure to make and go to all appointments, and call your doctor if you are having problems. It's  "also a good idea to know your test results and keep a list of the medicines you take.  When should you call for help?   Call 911  anytime you think you may need emergency care. For example, call if you or someone else:    Has overdosed or has withdrawal signs. Be sure to tell the emergency workers that you are or someone else is using or trying to quit using drugs. Overdose or withdrawal signs may include:  Losing consciousness.  Seizure.  Seeing or hearing things that aren't there (hallucinations).     Is thinking or talking about suicide or harming others.   Where to get help 24 hours a day, 7 days a week   If you or someone you know talks about suicide, self-harm, a mental health crisis, a substance use crisis, or any other kind of emotional distress, get help right away. You can:    Call the Suicide and Crisis Lifeline at 988.     Call 7-506-388-TALK (1-829.126.3552).     Text HOME to 803194 to access the Crisis Text Line.   Consider saving these numbers in your phone.  Go to [employer] for more information or to chat online.  Call your doctor now or seek immediate medical care if:    You are having withdrawal symptoms. These may include nausea or vomiting, sweating, shakiness, and anxiety.   Watch closely for changes in your health, and be sure to contact your doctor if:    You have a relapse.     You need more help or support to stop.   Where can you learn more?  Go to https://www.[employer].net/patiented  Enter H573 in the search box to learn more about \"Substance Use Disorder: Care Instructions.\"  Current as of: November 15, 2023  Content Version: 14.2 2024 [facility].   Care instructions adapted under license by your healthcare professional. If you have questions about a medical condition or this instruction, always ask your healthcare professional. [employer] disclaims any warranty or liability for your use of this information.       "

## 2024-11-20 NOTE — PROGRESS NOTES
"Preventive Care Visit  [facility]  ALFONZO JIMENEZ MD, Family Medicine  Nov 20, 2024      Assessment & Plan     Routine general medical examination at a health care facility  > discussed RSV, COVID, Shingrix, and Flu vaccines, patient aware to get these completed at his retail pharmacy   - labs were already obtained by his nephrologist, PSA within normal limits, lipid panel showed mildly decreased HDL, patient already on statin     Chronic kidney disease, stage 4, severely decreased GFR (H)  ESRD (end stage renal disease) (H)  > Cr 3.9, GFR 16    - has follow-up with nephrology, currently on the transplant list at the      Patient has been advised of split billing requirements and indicates understanding: Yes        BMI  Estimated body mass index is 28.63 kg/m  as calculated from the following:    Height as of this encounter: 1.76 m (5' 9.29\").    Weight as of this encounter: 88.7 kg (195 lb 8 oz).       Counseling  Appropriate preventive services were addressed with this patient via screening, questionnaire, or discussion as appropriate for fall prevention, nutrition, physical activity, Tobacco-use cessation, social engagement, weight loss and cognition.  Checklist reviewing preventive services available has been given to the patient.  Reviewed patient's diet, addressing concerns and/or questions.   The patient was instructed to see the dentist every 6 months.       Lance Weaver is a 64 year old, presenting for the following:  Annual Visit (Not fasting )        11/20/2024    11:23 AM   Additional Questions   Roomed by Jessica RAMOS   Accompanied by Wife, Magalie          HPI  Physical, not fasting       Health Care Directive  Patient does not have a Health Care Directive: Discussed advance care planning with patient; information given to patient to review.          11/19/2024   General Health   How would you rate your overall physical health? (!) FAIR   Feel stress (tense, anxious, or unable " to sleep) Not at all            11/19/2024   Nutrition   Three or more servings of calcium each day? Yes   Diet: Low salt   How many servings of fruit and vegetables per day? (!) 2-3   How many sweetened beverages each day? 0-1            11/19/2024   Exercise   Days per week of moderate/strenous exercise 0 days   Average minutes spent exercising at this level 0 min      (!) EXERCISE CONCERN      11/19/2024   Social Factors   Frequency of gathering with friends or relatives Twice a week   Worry food won't last until get money to buy more No   Food not last or not have enough money for food? No   Do you have housing? (Housing is defined as stable permanent housing and does not include staying ouside in a car, in a tent, in an abandoned building, in an overnight shelter, or couch-surfing.) Yes   Are you worried about losing your housing? No   Lack of transportation? No   Unable to get utilities (heat,electricity)? No            11/19/2024   Fall Risk   Fallen 2 or more times in the past year? No    Trouble with walking or balance? No        Patient-reported          11/19/2024   Dental   Dentist two times every year? (!) NO                   Today's PHQ-2 Score:       9/16/2024     9:05 AM   PHQ-2 ( 1999 [employer])   Q1: Little interest or pleasure in doing things 0   Q2: Feeling down, depressed or hopeless 0   PHQ-2 Score 0           11/19/2024   Substance Use   Alcohol more than 3/day or more than 7/wk No   Do you use any other substances recreationally? (!) CANNABIS PRODUCTS        Social History     Tobacco Use    Smoking status: Former     Types: Cigars    Smokeless tobacco: Never    Tobacco comments:     has one cigar every 3 mos   Vaping Use    Vaping status: Never Used   Substance Use Topics    Alcohol use: Yes     Comment: 3-6 drinks per week    Drug use: No     Comment: THC gummies - sleep           11/19/2024   STI Screening   New sexual partner(s) since last STI/HIV test? No      Last PSA:   Prostate Specific  "Antigen Screen   Date Value Ref Range Status   10/03/2024 0.77 0.00 - 4.50 ng/mL Final     ASCVD Risk   The 10-year ASCVD risk score (Drea LINTON, et al., 2019) is: 12.6%    Values used to calculate the score:      Age: 64 years      Sex: Male      Is Non- : No      Diabetic: No      Tobacco smoker: No      Systolic Blood Pressure: 128 mmHg      Is BP treated: Yes      HDL Cholesterol: 36 mg/dL      Total Cholesterol: 136 mg/dL           Reviewed and updated as needed this visit by Provider                    Review of Systems   Constitutional:  Negative for chills and fever.   HENT:  Negative for ear pain.    Eyes:  Negative for pain.   Respiratory:  Negative for cough.    Cardiovascular:  Negative for chest pain.   Gastrointestinal:  Negative for abdominal pain.   Genitourinary:  Negative for dysuria.   Musculoskeletal:  Negative for neck pain.   Skin:  Negative for rash.   Neurological:  Negative for headaches.          Objective    Exam  /82 (BP Location: Right arm, Patient Position: Sitting, Cuff Size: Adult Large)   Pulse 91   Temp 97.9  F (36.6  C) (Tympanic)   Resp 16   Ht 1.76 m (5' 9.29\")   Wt 88.7 kg (195 lb 8 oz)   SpO2 99%   BMI 28.63 kg/m     Estimated body mass index is 28.63 kg/m  as calculated from the following:    Height as of this encounter: 1.76 m (5' 9.29\").    Weight as of this encounter: 88.7 kg (195 lb 8 oz).    Physical Exam  Constitutional:       General: He is not in acute distress.  HENT:      Head: Normocephalic and atraumatic.      Right Ear: External ear normal.      Left Ear: External ear normal.      Mouth/Throat:      Mouth: Mucous membranes are moist.      Pharynx: Oropharynx is clear. No oropharyngeal exudate or posterior oropharyngeal erythema.   Eyes:      Extraocular Movements: Extraocular movements intact.   Cardiovascular:      Rate and Rhythm: Normal rate and regular rhythm.      Heart sounds: Normal heart sounds.   Pulmonary:     "  Effort: Pulmonary effort is normal. No respiratory distress.      Breath sounds: Normal breath sounds. No wheezing or rhonchi.   Abdominal:      Palpations: Abdomen is soft. There is no mass.      Tenderness: There is no abdominal tenderness.   Musculoskeletal:         General: No deformity. Normal range of motion.      Cervical back: Normal range of motion and neck supple.   Skin:     General: Skin is warm.      Findings: No rash.   Neurological:      General: No focal deficit present.      Mental Status: He is alert and oriented to person, place, and time.   Psychiatric:         Mood and Affect: Mood normal.      Comments: Very pleasant           Signed Electronically by: ALFONZO JIMENEZ MD

## 2024-12-02 ENCOUNTER — TELEPHONE (OUTPATIENT)
Dept: GASTROENTEROLOGY | Facility: CLINIC | Age: 64
End: 2024-12-02
Payer: COMMERCIAL

## 2024-12-02 NOTE — TELEPHONE ENCOUNTER
REMINDER: We do not accept [employer] insurance.      Procedure Scheduled: FLEXIBLE SIGMOIDOSCOPY [Flex Sig]  Procedure Date: 01/21/2025  Site:[facility]; [address] [place] [address], [place] [address]  Endoscopist: Felipe   Ordering Provider: Felipe   Scheduled by (per the direction of): Felipe

## 2024-12-02 NOTE — TELEPHONE ENCOUNTER
Caller: Titus Fowler   Reason for Reschedule/Cancellation (please be detailed, any staff messages or encounters to note?):     Per wife/adeline - pt is unwell would like 1st avail with any provider       Prior to reschedule please review:  Ordering Provider:    Jaron Wang MD      Sedation Determined: mod   Does patient have any ASC Exclusions, please identify?: n       Notes on Cancelled Procedure:  Procedure:FLEXIBLE SIGMOIDOSCOPY [Flex Sig]   Date: 12/02 (same day cancel)   Location:[facility]; [address] [place] [address]  Surgeon: Felipe          Rescheduled: yes   Procedure: FLEXIBLE SIGMOIDOSCOPY [Flex Sig]  Date: 12/09   Location: [facility]; [address] [place] [address]   Surgeon: Anaid per pt wife   Sedation Level Scheduled  mod          Reason for Sedation Level per order   Prep/Instructions updated and sent: mychart     Does patient need PAC or Pre -Op Rescheduled? : n       Send In - basket message to Panc - Prabhjot Pool if EUS procedure is canceled or rescheduled: [ N/A, YES or NO] n